# Patient Record
Sex: FEMALE | Race: NATIVE HAWAIIAN OR OTHER PACIFIC ISLANDER | NOT HISPANIC OR LATINO | Employment: OTHER | URBAN - METROPOLITAN AREA
[De-identification: names, ages, dates, MRNs, and addresses within clinical notes are randomized per-mention and may not be internally consistent; named-entity substitution may affect disease eponyms.]

---

## 2019-04-16 ENCOUNTER — TRANSFERRED RECORDS (OUTPATIENT)
Dept: HEALTH INFORMATION MANAGEMENT | Facility: CLINIC | Age: 69
End: 2019-04-16

## 2019-04-16 ENCOUNTER — HOSPITAL ENCOUNTER (EMERGENCY)
Facility: CLINIC | Age: 69
Discharge: HOME | End: 2019-04-16
Attending: EMERGENCY MEDICINE | Admitting: EMERGENCY MEDICINE
Payer: MEDICARE

## 2019-04-16 ENCOUNTER — APPOINTMENT (OUTPATIENT)
Dept: GENERAL RADIOLOGY | Facility: CLINIC | Age: 69
End: 2019-04-16
Attending: EMERGENCY MEDICINE
Payer: MEDICARE

## 2019-04-16 VITALS
RESPIRATION RATE: 20 BRPM | SYSTOLIC BLOOD PRESSURE: 186 MMHG | OXYGEN SATURATION: 100 % | TEMPERATURE: 97.9 F | DIASTOLIC BLOOD PRESSURE: 95 MMHG | HEART RATE: 56 BPM

## 2019-04-16 DIAGNOSIS — R07.9 CHEST PAIN, UNSPECIFIED TYPE: ICD-10-CM

## 2019-04-16 LAB
ANION GAP SERPL CALCULATED.3IONS-SCNC: 10 MMOL/L (ref 3–14)
BASOPHILS # BLD AUTO: 0.1 10E9/L (ref 0–0.2)
BASOPHILS NFR BLD AUTO: 1 %
BUN SERPL-MCNC: 18 MG/DL (ref 7–30)
CALCIUM SERPL-MCNC: 9.3 MG/DL (ref 8.5–10.1)
CHLORIDE SERPL-SCNC: 104 MMOL/L (ref 94–109)
CO2 SERPL-SCNC: 26 MMOL/L (ref 20–32)
CREAT SERPL-MCNC: 0.73 MG/DL (ref 0.52–1.04)
DIFFERENTIAL METHOD BLD: NORMAL
EOSINOPHIL # BLD AUTO: 0.7 10E9/L (ref 0–0.7)
EOSINOPHIL NFR BLD AUTO: 8.5 %
ERYTHROCYTE [DISTWIDTH] IN BLOOD BY AUTOMATED COUNT: 12.3 % (ref 10–15)
GFR SERPL CREATININE-BSD FRML MDRD: 85 ML/MIN/{1.73_M2}
GLUCOSE SERPL-MCNC: 118 MG/DL (ref 70–99)
HCT VFR BLD AUTO: 41.1 % (ref 35–47)
HGB BLD-MCNC: 13.8 G/DL (ref 11.7–15.7)
IMM GRANULOCYTES # BLD: 0 10E9/L (ref 0–0.4)
IMM GRANULOCYTES NFR BLD: 0.2 %
LYMPHOCYTES # BLD AUTO: 3.5 10E9/L (ref 0.8–5.3)
LYMPHOCYTES NFR BLD AUTO: 42.1 %
MCH RBC QN AUTO: 28.5 PG (ref 26.5–33)
MCHC RBC AUTO-ENTMCNC: 33.6 G/DL (ref 31.5–36.5)
MCV RBC AUTO: 85 FL (ref 78–100)
MONOCYTES # BLD AUTO: 0.5 10E9/L (ref 0–1.3)
MONOCYTES NFR BLD AUTO: 5.6 %
NEUTROPHILS # BLD AUTO: 3.5 10E9/L (ref 1.6–8.3)
NEUTROPHILS NFR BLD AUTO: 42.6 %
NRBC # BLD AUTO: 0 10*3/UL
NRBC BLD AUTO-RTO: 0 /100
PLATELET # BLD AUTO: 281 10E9/L (ref 150–450)
POTASSIUM SERPL-SCNC: 3.7 MMOL/L (ref 3.4–5.3)
RBC # BLD AUTO: 4.84 10E12/L (ref 3.8–5.2)
SODIUM SERPL-SCNC: 140 MMOL/L (ref 133–144)
TROPONIN I SERPL-MCNC: <0.015 UG/L (ref 0–0.04)
WBC # BLD AUTO: 8.3 10E9/L (ref 4–11)

## 2019-04-16 PROCEDURE — 85025 COMPLETE CBC W/AUTO DIFF WBC: CPT | Performed by: EMERGENCY MEDICINE

## 2019-04-16 PROCEDURE — 84484 ASSAY OF TROPONIN QUANT: CPT | Performed by: EMERGENCY MEDICINE

## 2019-04-16 PROCEDURE — 93005 ELECTROCARDIOGRAM TRACING: CPT

## 2019-04-16 PROCEDURE — 80048 BASIC METABOLIC PNL TOTAL CA: CPT | Performed by: EMERGENCY MEDICINE

## 2019-04-16 PROCEDURE — 99285 EMERGENCY DEPT VISIT HI MDM: CPT | Mod: 25

## 2019-04-16 PROCEDURE — 71046 X-RAY EXAM CHEST 2 VIEWS: CPT

## 2019-04-16 ASSESSMENT — ENCOUNTER SYMPTOMS
CHILLS: 0
BACK PAIN: 0
ABDOMINAL PAIN: 0
SHORTNESS OF BREATH: 0
DYSURIA: 0
PALPITATIONS: 0
DIFFICULTY URINATING: 0
COUGH: 0
VOMITING: 0
NAUSEA: 0
FEVER: 0

## 2019-04-16 NOTE — ED NOTES
Bed: ED15  Expected date: 4/16/19  Expected time: 5:18 PM  Means of arrival:   Comments:  [id number] CP, t-wave abnormality

## 2019-04-16 NOTE — ED PROVIDER NOTES
"  History     Chief Complaint:  Chest pain    HPI   Judith Palomares is a 68 year old female who presents with chest pain and shortness of breath. The patient states that she has a long standing history of exertional chest pain for which she has followed at [facility] at one point with an unremarkable exam at that time. The patient reports always having some left sided chest pain with heavy exertion that resolved around 5 minutes later when she stops and rests. The patient notes that since Saturday, she has been exerting herself much more than usual as she is preparing for a trip abroad. Given the more frequent chest pains she was having during this exertion, the patient states she \"decided to get checked out\" and went to clinic. There the patient reportedly had some T wave changes and was referred to the ED via EMS for evaluation. EMS notes that the patient was vitally stable and had a pre-hospital EKG that revealed some T wave changes but no signs of STEMI. The patient here states she has no active chest pain and only has this with exertion, in particular going up stairs. She otherwise denies any shortness of breath or recent illness. The patient has no fevers, chills, medication changes, back pain, abdominal pain, nausea, vomiting, diarrhea, or urinary symptoms.    CARDIAC RISK FACTORS:  Sex:    Female  Tobacco:   Former  Hypertension:   No  Hyperlipidemia:  No  Diabetes:   No  Family History:  No    PE/DVT RISK FACTORS:  Sex:    Female  Hormones:   Former  Tobacco:   No  Cancer:   No  Travel:   No  Surgery:   No  Other immobilization: No  Personal history:  No  Family history:  No    Allergies:  No known drug allergies     Medications:    Plavix     Past Medical History:    Stroke     Past Surgical History:    History reviewed. No pertinent surgical history.     Family History:    History reviewed. No pertinent family history.      Social History:  Smoking Status: Former Smoker  Patient presents with " .      Review of Systems   Constitutional: Negative for chills and fever.   Respiratory: Negative for cough and shortness of breath.    Cardiovascular: Positive for chest pain. Negative for palpitations and leg swelling.   Gastrointestinal: Negative for abdominal pain, nausea and vomiting.   Genitourinary: Negative for difficulty urinating, dysuria and urgency.   Musculoskeletal: Negative for back pain.   All other systems reviewed and are negative.      Physical Exam     Patient Vitals for the past 24 hrs:   BP Temp Temp src Heart Rate Resp SpO2   04/16/19 1730 -- 97.9  F (36.6  C) Oral -- -- --   04/16/19 1727 (!) 204/112 -- -- 60 18 100 %      Physical Exam  Constitutional: vitals reviewed  HENT: Moist oral mucosa.   Eyes: Grossly normal vision. Pupils are equal and round. Extraocular movements intact.  Sclera clear and without icterus. Conjunctiva without pallor.  Cardiovascular: Normal rate. Regular rhythm. S1 and S2 without audible murmurs. 2+ radial pulses. Normal capillary refill.  Respiratory: Effort normal. Clear breath sounds bilaterally.  Gastrointestinal: Soft. No distension. No tenderness to palpation. No rebound or guarding.  Neurologic: Alert and awake. Coordinated movement of extremities. Speech normal.  Skin: No diaphoresis, rashes, ecchymoses, or lesions.  Musculoskeletal: No lower extremity edema. No gross deformity. No joint swelling.  Psychiatric: Appropriate affect. Behavior is normal. Intact recent and remote memory. Linear thought process.      Emergency Department Course     ECG (17:32:28):  Rate 58 bpm. CA interval 170. QRS duration 142. QT/QTc 474/465. P-R-T axes 35 -48 -1. Sinus bradycardia. Left axis deviation. right bundle branch block. Inferior infarct, age undetermined. Abnormal ECG. Interpreted at 1738 by Brock Pichardo MD.     Imaging:  Radiographic findings were communicated with the patient who voiced understanding of the findings.    X-ray Chest, 2 views:  Clear  lungs.  Result per radiology.     Laboratory:  1741 - Troponin: <0.015   CBC: WNL (WBC 8.3, HGB 13.8, )   BMP: Glucose 118 (H), o/w WNL (Creatinine 0.73)     Emergency Department Course:  The patient arrived in the emergency department via EMS.   Past medical records, nursing notes, and vitals reviewed.  1723: I performed an exam of the patient and obtained history, as documented above.   IV inserted and blood drawn.     The patient was sent for a X-ray while in the emergency department, findings above.        1842: I rechecked the patient. Findings and plan explained to the Patient. Patient discharged home with instructions regarding supportive care, medications, and reasons to return. The importance of close follow-up was reviewed.      Impression & Plan      Medical Decision Making:  Patient who presents with exertional chest pain that has been occurring for the last 2 years with severe exertion.  She is concerned today because she is going abroad for vacation.  Vitals are stable with hypertension noted.  No ongoing pain symptoms.  ECG from clinic is stable compared to the ECG obtained in the emergency department here.  Chest x-ray is clear.  Pulses are symmetric, and without chest pain or neurologic symptoms I have low concern for dissection.  Low concern for pulmonary embolism as well.  Troponin is negative, and with her ED ACS score being low risk, I feel that outpatient follow-up is appropriate.  That this is possibly a diagnosis of noncardiac chest pain versus stable angina given that she has had the symptoms for over 2 years without change or escalation.  She is already taking Plavix.  Follow-up with primary care in 1 day for further risk assessment and work-up.  Return precautions discussed.  She left the emergency department in stable condition.    Diagnosis:    ICD-10-CM    1. Chest pain, unspecified type R07.9        Long Pang  4/16/2019    EMERGENCY DEPARTMENT  ILong am  serving as a scribe at 6:44 PM on 4/16/2019 to document services personally performed by Brock Pichardo MD based on my observations and the provider's statements to me.         Brock Pichardo MD  04/16/19 2043

## 2019-04-16 NOTE — ED AVS SNAPSHOT
Emergency Department  [address] [place] [id number]  Phone:  [phone]  Fax:  [phone]                                    Soledad Palomares   MRN: [id number]    Department:   Emergency Department   Date of Visit:  4/16/2019           After Visit Summary Signature Page    I have received my discharge instructions, and my questions have been answered. I have discussed any challenges I see with this plan with the nurse or doctor.    ..........................................................................................................................................  Patient/Patient Representative Signature      ..........................................................................................................................................  Patient Representative Print Name and Relationship to Patient    ..................................................               ................................................  Date                                   Time    ..........................................................................................................................................  Reviewed by Signature/Title    ...................................................              ..............................................  Date                                               Time          22EPIC Rev 08/18

## 2019-04-16 NOTE — DISCHARGE INSTRUCTIONS
Discharge Instructions  Chest Pain    You have been seen today for chest pain or discomfort.  At this time, your provider has found no signs that your chest pain is due to a serious or life-threatening condition, (or you have declined more testing and/or admission to the hospital). However, sometimes there is a serious problem that does not show up right away. Your evaluation today may not be complete and you may need further testing and evaluation.     Generally, every Emergency Department visit should have a follow-up clinic visit with either a primary or a specialty clinic/provider. Please follow-up as instructed by your emergency provider today.  Return to the Emergency Department if:  Your chest pain changes, gets worse, starts to happen more often, or comes with less activity.  You are newly short of breath.  You get very weak or tired.  You pass out or faint.  You have any new symptoms, like fever, cough, numb legs, or you cough up blood.  You have anything else that worries you.    Until you follow-up with your regular provider, please do the following:  If you have questions, contact your regular provider.  Follow-up with your regular provider/clinic as directed; this is very important.    If you were given a prescription for medicine here today, be sure to read all of the information (including the package insert) that comes with your prescription.  This will include important information about the medicine, its side effects, and any warnings that you need to know about.  The pharmacist who fills the prescription can provide more information and answer questions you may have about the medicine.  If you have questions or concerns that the pharmacist cannot address, please call or return to the Emergency Department.       Remember that you can always come back to the Emergency Department if you are not able to see your regular provider in the amount of time listed above, if you get any new symptoms, or if  there is anything that worries you.

## 2019-04-18 LAB — INTERPRETATION ECG - MUSE: NORMAL

## 2019-11-14 ENCOUNTER — HOSPITAL ENCOUNTER (OUTPATIENT)
Facility: CLINIC | Age: 69
Setting detail: OBSERVATION
Discharge: HOME | End: 2019-11-15
Attending: EMERGENCY MEDICINE | Admitting: INTERNAL MEDICINE
Payer: MEDICARE

## 2019-11-14 ENCOUNTER — APPOINTMENT (OUTPATIENT)
Dept: GENERAL RADIOLOGY | Facility: CLINIC | Age: 69
End: 2019-11-14
Attending: EMERGENCY MEDICINE
Payer: MEDICARE

## 2019-11-14 DIAGNOSIS — R07.9 EXERTIONAL CHEST PAIN: ICD-10-CM

## 2019-11-14 LAB
ANION GAP SERPL CALCULATED.3IONS-SCNC: 6 MMOL/L (ref 3–14)
BASOPHILS # BLD AUTO: 0.1 10E9/L (ref 0–0.2)
BASOPHILS NFR BLD AUTO: 0.6 %
BUN SERPL-MCNC: 20 MG/DL (ref 7–30)
CALCIUM SERPL-MCNC: 8.8 MG/DL (ref 8.5–10.1)
CHLORIDE SERPL-SCNC: 100 MMOL/L (ref 94–109)
CO2 SERPL-SCNC: 27 MMOL/L (ref 20–32)
CREAT SERPL-MCNC: 0.72 MG/DL (ref 0.52–1.04)
D DIMER PPP FEU-MCNC: <0.3 UG/ML FEU (ref 0–0.5)
DIFFERENTIAL METHOD BLD: NORMAL
EOSINOPHIL # BLD AUTO: 0.5 10E9/L (ref 0–0.7)
EOSINOPHIL NFR BLD AUTO: 5.9 %
ERYTHROCYTE [DISTWIDTH] IN BLOOD BY AUTOMATED COUNT: 12.1 % (ref 10–15)
GFR SERPL CREATININE-BSD FRML MDRD: 85 ML/MIN/{1.73_M2}
GLUCOSE BLDC GLUCOMTR-MCNC: 190 MG/DL (ref 70–99)
GLUCOSE SERPL-MCNC: 179 MG/DL (ref 70–99)
HBA1C MFR BLD: 8.8 % (ref 0–5.6)
HCT VFR BLD AUTO: 41.1 % (ref 35–47)
HGB BLD-MCNC: 13.8 G/DL (ref 11.7–15.7)
IMM GRANULOCYTES # BLD: 0 10E9/L (ref 0–0.4)
IMM GRANULOCYTES NFR BLD: 0.3 %
INTERPRETATION ECG - MUSE: NORMAL
LYMPHOCYTES # BLD AUTO: 2.7 10E9/L (ref 0.8–5.3)
LYMPHOCYTES NFR BLD AUTO: 33.8 %
MCH RBC QN AUTO: 28.2 PG (ref 26.5–33)
MCHC RBC AUTO-ENTMCNC: 33.6 G/DL (ref 31.5–36.5)
MCV RBC AUTO: 84 FL (ref 78–100)
MONOCYTES # BLD AUTO: 0.5 10E9/L (ref 0–1.3)
MONOCYTES NFR BLD AUTO: 6.2 %
NEUTROPHILS # BLD AUTO: 4.2 10E9/L (ref 1.6–8.3)
NEUTROPHILS NFR BLD AUTO: 53.2 %
NRBC # BLD AUTO: 0 10*3/UL
NRBC BLD AUTO-RTO: 0 /100
PLATELET # BLD AUTO: 274 10E9/L (ref 150–450)
POTASSIUM SERPL-SCNC: 3.5 MMOL/L (ref 3.4–5.3)
RBC # BLD AUTO: 4.9 10E12/L (ref 3.8–5.2)
SODIUM SERPL-SCNC: 133 MMOL/L (ref 133–144)
TROPONIN I SERPL-MCNC: <0.015 UG/L (ref 0–0.04)
TROPONIN I SERPL-MCNC: <0.015 UG/L (ref 0–0.04)
WBC # BLD AUTO: 7.9 10E9/L (ref 4–11)

## 2019-11-14 PROCEDURE — 71046 X-RAY EXAM CHEST 2 VIEWS: CPT

## 2019-11-14 PROCEDURE — 93005 ELECTROCARDIOGRAM TRACING: CPT

## 2019-11-14 PROCEDURE — 84484 ASSAY OF TROPONIN QUANT: CPT | Performed by: EMERGENCY MEDICINE

## 2019-11-14 PROCEDURE — 84484 ASSAY OF TROPONIN QUANT: CPT | Mod: 91 | Performed by: INTERNAL MEDICINE

## 2019-11-14 PROCEDURE — 00000146 ZZHCL STATISTIC GLUCOSE BY METER IP

## 2019-11-14 PROCEDURE — 83036 HEMOGLOBIN GLYCOSYLATED A1C: CPT | Performed by: INTERNAL MEDICINE

## 2019-11-14 PROCEDURE — 25000132 ZZH RX MED GY IP 250 OP 250 PS 637: Mod: GY | Performed by: INTERNAL MEDICINE

## 2019-11-14 PROCEDURE — 85379 FIBRIN DEGRADATION QUANT: CPT | Performed by: INTERNAL MEDICINE

## 2019-11-14 PROCEDURE — 99285 EMERGENCY DEPT VISIT HI MDM: CPT | Mod: 25

## 2019-11-14 PROCEDURE — G0378 HOSPITAL OBSERVATION PER HR: HCPCS

## 2019-11-14 PROCEDURE — 36415 COLL VENOUS BLD VENIPUNCTURE: CPT | Performed by: INTERNAL MEDICINE

## 2019-11-14 PROCEDURE — 80048 BASIC METABOLIC PNL TOTAL CA: CPT | Performed by: EMERGENCY MEDICINE

## 2019-11-14 PROCEDURE — 99220 ZZC INITIAL OBSERVATION CARE,LEVL III: CPT | Performed by: INTERNAL MEDICINE

## 2019-11-14 PROCEDURE — 85025 COMPLETE CBC W/AUTO DIFF WBC: CPT | Performed by: EMERGENCY MEDICINE

## 2019-11-14 RX ORDER — ACETAMINOPHEN 650 MG/1
650 SUPPOSITORY RECTAL EVERY 4 HOURS PRN
Status: DISCONTINUED | OUTPATIENT
Start: 2019-11-14 | End: 2019-11-15 | Stop reason: HOSPADM

## 2019-11-14 RX ORDER — METFORMIN HCL 500 MG
1000 TABLET, EXTENDED RELEASE 24 HR ORAL 2 TIMES DAILY WITH MEALS
COMMUNITY

## 2019-11-14 RX ORDER — DEXTROSE MONOHYDRATE 25 G/50ML
25-50 INJECTION, SOLUTION INTRAVENOUS
Status: DISCONTINUED | OUTPATIENT
Start: 2019-11-14 | End: 2019-11-15 | Stop reason: HOSPADM

## 2019-11-14 RX ORDER — ACETAMINOPHEN 325 MG/1
650 TABLET ORAL EVERY 4 HOURS PRN
Status: DISCONTINUED | OUTPATIENT
Start: 2019-11-14 | End: 2019-11-15 | Stop reason: HOSPADM

## 2019-11-14 RX ORDER — FENOFIBRATE 67 MG/1
67 CAPSULE ORAL
COMMUNITY

## 2019-11-14 RX ORDER — NALOXONE HYDROCHLORIDE 0.4 MG/ML
.1-.4 INJECTION, SOLUTION INTRAMUSCULAR; INTRAVENOUS; SUBCUTANEOUS
Status: DISCONTINUED | OUTPATIENT
Start: 2019-11-14 | End: 2019-11-15 | Stop reason: HOSPADM

## 2019-11-14 RX ORDER — HYDROCHLOROTHIAZIDE 25 MG/1
25 TABLET ORAL EVERY MORNING
Status: DISCONTINUED | OUTPATIENT
Start: 2019-11-15 | End: 2019-11-15 | Stop reason: HOSPADM

## 2019-11-14 RX ORDER — NITROGLYCERIN 0.4 MG/1
0.4 TABLET SUBLINGUAL EVERY 5 MIN PRN
Status: DISCONTINUED | OUTPATIENT
Start: 2019-11-14 | End: 2019-11-15 | Stop reason: HOSPADM

## 2019-11-14 RX ORDER — ASPIRIN 81 MG/1
162 TABLET, CHEWABLE ORAL ONCE
Status: COMPLETED | OUTPATIENT
Start: 2019-11-14 | End: 2019-11-14

## 2019-11-14 RX ORDER — ASPIRIN 81 MG/1
81 TABLET ORAL DAILY
Status: DISCONTINUED | OUTPATIENT
Start: 2019-11-15 | End: 2019-11-15 | Stop reason: HOSPADM

## 2019-11-14 RX ORDER — ALUMINA, MAGNESIA, AND SIMETHICONE 2400; 2400; 240 MG/30ML; MG/30ML; MG/30ML
15 SUSPENSION ORAL EVERY 4 HOURS PRN
Status: DISCONTINUED | OUTPATIENT
Start: 2019-11-14 | End: 2019-11-15 | Stop reason: HOSPADM

## 2019-11-14 RX ORDER — LOSARTAN POTASSIUM 50 MG/1
50 TABLET ORAL 2 TIMES DAILY
Status: DISCONTINUED | OUTPATIENT
Start: 2019-11-14 | End: 2019-11-15 | Stop reason: HOSPADM

## 2019-11-14 RX ORDER — NICOTINE POLACRILEX 4 MG
15-30 LOZENGE BUCCAL
Status: DISCONTINUED | OUTPATIENT
Start: 2019-11-14 | End: 2019-11-15 | Stop reason: HOSPADM

## 2019-11-14 RX ORDER — LOSARTAN POTASSIUM 50 MG/1
50 TABLET ORAL 2 TIMES DAILY
COMMUNITY

## 2019-11-14 RX ORDER — CLOPIDOGREL BISULFATE 75 MG/1
75 TABLET ORAL DAILY
Status: DISCONTINUED | OUTPATIENT
Start: 2019-11-15 | End: 2019-11-15 | Stop reason: HOSPADM

## 2019-11-14 RX ORDER — LIDOCAINE 40 MG/G
CREAM TOPICAL
Status: DISCONTINUED | OUTPATIENT
Start: 2019-11-14 | End: 2019-11-15 | Stop reason: HOSPADM

## 2019-11-14 RX ADMIN — ASPIRIN 81 MG 162 MG: 81 TABLET ORAL at 20:20

## 2019-11-14 RX ADMIN — LOSARTAN POTASSIUM 50 MG: 50 TABLET, FILM COATED ORAL at 20:20

## 2019-11-14 ASSESSMENT — MIFFLIN-ST. JEOR
SCORE: 1279.38
SCORE: 1340.08

## 2019-11-14 ASSESSMENT — ENCOUNTER SYMPTOMS: NUMBNESS: 1

## 2019-11-14 NOTE — ED PROVIDER NOTES
"  History     Chief Complaint:  Chest Pain      HPI   Soledad Palomares is a 69 year old female who presents to the emergency department for evaluation of chest pain. The patient reports that three fingers on her left hand were numb this morning along with chest pain when she woke up. The patient reports that her numbness lasted about 5 minutes. The patient also reports that she has left shoulder pain. The patient states that her last stress test was about 7 years ago. She denies that she has a stent in her heart. The patient reports numbness, left shoulder pain and chest pain.    Allergies:  Lisinopril    Medications:    Atenolol  Atorvastatin  Plavix  Lofibra  Hydrochlorothiazide  Lantus  Cozaar  Metformin  Ozempic    Past Medical History:    Stroke  Cerebral infarction  Diabetes  Hypertension  Myocardial infarction    Past Surgical History:    Cholecystectomy  Orthopedic surgery  Foot surgery  Rotator cuff surgery    Family History:    No past pertinent family history.    Social History:  The patient presents with her two granddaughters.  Former smoker  Not currently using alcohol.  Negative for drug use.  Marital Status:      Review of Systems   Cardiovascular: Positive for chest pain.   Musculoskeletal:        Left shoulder pain   Neurological: Positive for numbness.   All other systems reviewed and are negative.      Physical Exam     Patient Vitals for the past 24 hrs:   BP Temp Temp src Pulse Resp SpO2 Height Weight   11/14/19 1555 137/79 -- -- -- -- -- -- --   11/14/19 1553 (!) 138/103 97.7  F (36.5  C) Temporal 67 16 95 % 1.575 m (5' 2\") 86.2 kg (190 lb)         Physical Exam    Eye:  Pupils are equal, round, and reactive.  Extraocular movements intact.    ENT:  No rhinorrhea.  Moist mucus membranes.  Normal tongue and tonsil.    Cardiac:  Regular rate and rhythm.  No murmurs, gallops, or rubs.    Pulmonary:  Clear to auscultation bilaterally.  No wheezes, rales, or rhonchi.    Abdomen:  Positive " bowel sounds.  Abdomen is soft and non-distended, without focal tenderness.    Musculoskeletal:  Normal movement of all extremities without evidence for deficit.    Skin:  Warm and dry without rashes.    Neurologic:  Non-focal exam without asymmetric weakness or numbness.     Psychiatric:  Normal affect with appropriate interaction with examiner.    Emergency Department Course     ECG:  Time: 1550  Vent. Rate 65 bpm. UT interval 170. QRS duration 102. QT/QTc 442/459. P-R-T axis 27 -47 47.  Normal sinus rhythm  Left axis deviation  Inferior infarct, age undetermined  Abnormal ECG  Read time: 1700    Imaging:  Radiology findings were communicated with the patient who voiced understanding of the findings.    XR Chest 2 Views:  No acute airspace disease.  As per radiology.    Laboratory:  Laboratory findings were communicated with the patient who voiced understanding of the findings.    CBC: WNL. (WBC 7.9, HGB 13.8, )   BMP: Glucose 179 (H) o/w WNL (Creatinine 0.72)    1659 Troponin I <0.015     Emergency Department Course:    1550 EKG obtained as noted above.    1647 Nursing notes and vitals reviewed.    1649 I performed an exam of the patient as documented above.     1659 IV was inserted and blood was drawn for laboratory testing, results above.    1734 The patient was sent for a XR Chest 2 Views while in the emergency department, results above.     1817 Patient rechecked and updated.     1846 I spoke with Dr. Bruner of the hospitalist service from [facility] regarding patient's presentation, findings, and plan of care.    Findings and plan explained to the Patient who consents to admission. Discussed the patient with Dr. Bruner, who will admit the patient to a Obs bed for further monitoring, evaluation, and treatment.      Impression & Plan     Medical Decision Making:  Soledad Palomares is a 69 year old female who presents to the emergency department today with concerns for exertional chest pain.   Patient has a long history of this and has undergone prior stress testing which is always been negative.  However, she has many cardiac risk factors and is not had a stress test in several years.  She notes that when she exerts herself she will feel a tightness across the left side of her chest.  She was chiefly concerned today because when she woke up this morning, she felt numbness and tingling into her left hand that seem to radiate up into the chest.  Once she rolled over and got up, the tingling went away after approximately 5 minutes.  Because of concerns that this is an anginal equivalent, she elected to come in to be seen.    On my exam, the patient appears clinically well.  She has no chest pain at this time.  Her vital signs are reassuring.  Head to toe exam shows no significant abnormality.  EKG is reassuring.  Chest x-ray is normal.  Initial troponin is negative.  Her labs are otherwise unremarkable.    Because of her multiple risk factors along with exertional chest pain, I do feel it is reasonable to formally rule her out and to consider provocative testing in the morning.  The patient is comfortable with this plan for admission as is Dr. Bruner who will admit the patient under observation status.    Discharge Diagnosis:    ICD-10-CM    1. Exertional chest pain R07.9        Disposition:  The patient is admitted into the care of Dr. Bruner.     Scribe Disclosure:  I, Cedric Butler, am serving as a scribe at 4:57 PM on 11/14/2019 to document services personally performed by Trierweiler, Chad A, MD based on my observations and the provider's statements to me.      Trierweiler, Chad A, MD  11/15/19 4031

## 2019-11-14 NOTE — ED TRIAGE NOTES
Patient in with complaints of chest pain, numbness in her left fingers with tingling in left arm. Denies dizziness and SOB.

## 2019-11-15 ENCOUNTER — APPOINTMENT (OUTPATIENT)
Dept: NUCLEAR MEDICINE | Facility: CLINIC | Age: 69
End: 2019-11-15
Attending: INTERNAL MEDICINE
Payer: MEDICARE

## 2019-11-15 VITALS
OXYGEN SATURATION: 95 % | HEART RATE: 56 BPM | TEMPERATURE: 97.1 F | RESPIRATION RATE: 16 BRPM | SYSTOLIC BLOOD PRESSURE: 160 MMHG | DIASTOLIC BLOOD PRESSURE: 86 MMHG | HEIGHT: 62 IN | BODY MASS INDEX: 32.5 KG/M2 | WEIGHT: 176.59 LBS

## 2019-11-15 LAB
CV STRESS MAX HR HE: 81
GLUCOSE BLDC GLUCOMTR-MCNC: 148 MG/DL (ref 70–99)
GLUCOSE BLDC GLUCOMTR-MCNC: 158 MG/DL (ref 70–99)
GLUCOSE BLDC GLUCOMTR-MCNC: 185 MG/DL (ref 70–99)
GLUCOSE BLDC GLUCOMTR-MCNC: 234 MG/DL (ref 70–99)
RATE PRESSURE PRODUCT: NORMAL
STRESS ECHO BASELINE DIASTOLIC HE: 87
STRESS ECHO BASELINE HR: 56
STRESS ECHO BASELINE SYSTOLIC BP: 142
STRESS ECHO CALCULATED PERCENT HR: 54 %
STRESS ECHO LAST STRESS DIASTOLIC BP: 75
STRESS ECHO LAST STRESS SYSTOLIC BP: 131
STRESS ECHO TARGET HR: 151
TROPONIN I SERPL-MCNC: <0.015 UG/L (ref 0–0.04)

## 2019-11-15 PROCEDURE — 78452 HT MUSCLE IMAGE SPECT MULT: CPT

## 2019-11-15 PROCEDURE — 93018 CV STRESS TEST I&R ONLY: CPT | Performed by: INTERNAL MEDICINE

## 2019-11-15 PROCEDURE — G0378 HOSPITAL OBSERVATION PER HR: HCPCS

## 2019-11-15 PROCEDURE — 40000855 ZZH STATISTIC ECHO STRESS OR NM NPI

## 2019-11-15 PROCEDURE — 78452 HT MUSCLE IMAGE SPECT MULT: CPT | Mod: 26 | Performed by: INTERNAL MEDICINE

## 2019-11-15 PROCEDURE — A9502 TC99M TETROFOSMIN: HCPCS | Performed by: INTERNAL MEDICINE

## 2019-11-15 PROCEDURE — 34300033 ZZH RX 343: Performed by: INTERNAL MEDICINE

## 2019-11-15 PROCEDURE — 84484 ASSAY OF TROPONIN QUANT: CPT | Performed by: INTERNAL MEDICINE

## 2019-11-15 PROCEDURE — 93016 CV STRESS TEST SUPVJ ONLY: CPT | Performed by: INTERNAL MEDICINE

## 2019-11-15 PROCEDURE — 25000131 ZZH RX MED GY IP 250 OP 636 PS 637: Mod: GY | Performed by: INTERNAL MEDICINE

## 2019-11-15 PROCEDURE — 99217 ZZC OBSERVATION CARE DISCHARGE: CPT | Performed by: PHYSICIAN ASSISTANT

## 2019-11-15 PROCEDURE — 93017 CV STRESS TEST TRACING ONLY: CPT

## 2019-11-15 PROCEDURE — 00000146 ZZHCL STATISTIC GLUCOSE BY METER IP

## 2019-11-15 PROCEDURE — 25000128 H RX IP 250 OP 636: Performed by: INTERNAL MEDICINE

## 2019-11-15 PROCEDURE — 96372 THER/PROPH/DIAG INJ SC/IM: CPT

## 2019-11-15 PROCEDURE — 25000132 ZZH RX MED GY IP 250 OP 250 PS 637: Mod: GY | Performed by: INTERNAL MEDICINE

## 2019-11-15 PROCEDURE — 36415 COLL VENOUS BLD VENIPUNCTURE: CPT | Performed by: INTERNAL MEDICINE

## 2019-11-15 RX ORDER — OMEPRAZOLE 20 MG/1
20 TABLET, DELAYED RELEASE ORAL DAILY
Qty: 30 TABLET | Refills: 0 | Status: SHIPPED | OUTPATIENT
Start: 2019-11-15

## 2019-11-15 RX ORDER — REGADENOSON 0.08 MG/ML
0.4 INJECTION, SOLUTION INTRAVENOUS ONCE
Status: COMPLETED | OUTPATIENT
Start: 2019-11-15 | End: 2019-11-15

## 2019-11-15 RX ORDER — AMINOPHYLLINE 25 MG/ML
50-100 INJECTION, SOLUTION INTRAVENOUS
Status: DISCONTINUED | OUTPATIENT
Start: 2019-11-15 | End: 2019-11-15

## 2019-11-15 RX ORDER — ALBUTEROL SULFATE 90 UG/1
2 AEROSOL, METERED RESPIRATORY (INHALATION) EVERY 5 MIN PRN
Status: DISCONTINUED | OUTPATIENT
Start: 2019-11-15 | End: 2019-11-15

## 2019-11-15 RX ORDER — LOSARTAN POTASSIUM 50 MG/1
50 TABLET ORAL DAILY
Start: 2019-11-15

## 2019-11-15 RX ORDER — ACYCLOVIR 200 MG/1
0-1 CAPSULE ORAL
Status: DISCONTINUED | OUTPATIENT
Start: 2019-11-15 | End: 2019-11-15

## 2019-11-15 RX ADMIN — ASPIRIN 81 MG: 81 TABLET, COATED ORAL at 08:32

## 2019-11-15 RX ADMIN — CLOPIDOGREL BISULFATE 75 MG: 75 TABLET ORAL at 08:32

## 2019-11-15 RX ADMIN — REGADENOSON 0.4 MG: 0.08 INJECTION, SOLUTION INTRAVENOUS at 09:43

## 2019-11-15 RX ADMIN — INSULIN GLARGINE 30 UNITS: 100 INJECTION, SOLUTION SUBCUTANEOUS at 08:35

## 2019-11-15 RX ADMIN — LOSARTAN POTASSIUM 50 MG: 50 TABLET, FILM COATED ORAL at 08:32

## 2019-11-15 RX ADMIN — TETROFOSMIN 30.4 MCI.: 1.38 INJECTION, POWDER, LYOPHILIZED, FOR SOLUTION INTRAVENOUS at 09:43

## 2019-11-15 RX ADMIN — HYDROCHLOROTHIAZIDE 25 MG: 25 TABLET ORAL at 08:32

## 2019-11-15 RX ADMIN — TETROFOSMIN 10.7 MCI.: 1.38 INJECTION, POWDER, LYOPHILIZED, FOR SOLUTION INTRAVENOUS at 07:51

## 2019-11-15 NOTE — PROGRESS NOTES
0943 procedure started    Lexiscan Stress: Pt tolerated well. VSS. Pt denied chest pain or pressure prior to injection. After injection pt c/o some chest heaviness and a HA.  Pt back to baseline except still a slight HA.  Encouraged to have caffeine when back to the room.    0950 procedure completed    0952 Pt transferred back to  Obs unit room 12 per w/c.

## 2019-11-15 NOTE — PLAN OF CARE
A&Ox4. BP elevated (160/86), otherwise VSS on RA. Independent in room. Continent, up to bathroom. Tele SR. BG insulin coverage given. Lexiscan stress test today, tolerated well. Regular diet. Denied pain, no CP. CMS intact.     Addendum: Pt discharged home with . PIV removed. Walked out of unit, refused w/c transport.

## 2019-11-15 NOTE — PROGRESS NOTES
RECEIVING UNIT ED HANDOFF REVIEW    ED Nurse Handoff Report was reviewed by: Jose Garner RN on November 14, 2019 at 7:34 PM

## 2019-11-15 NOTE — PROGRESS NOTES
Observation goals PRIOR TO DISCHARGE     Comments: List all goals to be met before discharge home:   - Serial troponins and stress test complete. NOT MET  - Seen and cleared by consultant if applicable N/A  - Adequate pain control on oral analgesia MET  - Vital signs normal or at patient baseline MET  - Safe disposition plan has been identified NOT MET  - Nurse to notify provider when observation goals have been met and patient is ready for discharge.

## 2019-11-15 NOTE — DISCHARGE SUMMARY
[facility]    Discharge Summary  Hospitalist    Date of Admission:  11/14/2019  Date of Discharge:  11/15/2019  Discharging Provider: Daniel Flores PA-C    Discharge Diagnoses   Exertional chest pain    History of Present Illness   Soledad Palomares is an 69 year old female who presented with chest pain.    HPI from admission H&P:  Soledad Palomares is a 69 year old female with a history of stroke on Plavix, insulin-dependent diabetes mellitus, hypertension who presents with several days of left-sided chest pain and left hand numbness and tingling today.  The patient reports that this morning she woke up with chest pain, rubbed her chest and tried to lay on her left side.  She started noticing some numbness on her left hand going up her arm which lasted for about 5 minutes.  She spoke with her family members and finally decided to present to the ED.  The patient has been having intermittent left-sided chest pain for several days now which recently started getting worse with activity.  She has had stress test x3 in the past with last one 7 to 9 years ago.  She denies any shortness of breath, palpitation, dizziness, lightheadedness, nausea, vomiting, diaphoresis.     When she presented to the ED temperature was 97.7, pulse rate 67, blood pressure 138/103 which improved to 137/79, respiratory 16, O2 sat 90% on room air.  Labs showed unremarkable CBC and a BMP, troponin x1 was negative glucose is 179.  Chest x-ray shows no acute cardiopulmonary process.    Hospital Course   Soledad Palomares was admitted on 11/14/2019.  The following problems were addressed during her hospitalization:    Chest pain   Presented with exertional chest pain for last several days with some atypical chest pain day of admission with left arm numbness. EKG negative, serial troponins negative. Lexiscan stress imaging negative. Noted blood pressures to be borderline, was also noted at PCP office during last visit. Discussed  possibility of hypertension versus GERD contributing to symptoms. Pt agreeable to trial PPI and increasing losartan to 100mg qAM, 50mg qPM. Will follow up closely with PCP at discharge to discuss.     Essential Hypertension   BPs initially elevated, remain slightly borderline at discharge. PTA on losartan, atenolol, hydrochlorothiazide. Changes as noted above.     Diabetes Mellitus type 2,  Insulin Dependent  PTA on Lantus 42 units in the morning, metformin 1000mg twice daily. Hgb A1c with improved control, 8.8%. Continues on PTA regimen, management per endocrinologist.     History of stroke  PTA on Plavix, continue PTA Plavix    Daniel Flores PA-C    Significant Results and Procedures   As noted    Pending Results   These results will be followed up by n/a  Unresulted Labs Ordered in the Past 30 Days of this Admission     No orders found for last 31 day(s).          Code Status   Full Code       Primary Care Physician   Audrey Martinez    Physical Exam   Temp: 97.1  F (36.2  C) Temp src: Oral BP: (!) 160/86 Pulse: 56 Heart Rate: 61 Resp: 16 SpO2: 95 % O2 Device: None (Room air)    Vitals:    11/14/19 1553 11/1950   Weight: 86.2 kg (190 lb) 80.1 kg (176 lb 9.4 oz)     Vital Signs with Ranges  Temp:  [95.5  F (35.3  C)-97.7  F (36.5  C)] 97.1  F (36.2  C)  Pulse:  [56-67] 56  Heart Rate:  [60-88] 61  Resp:  [16] 16  BP: (118-160)/() 160/86  SpO2:  [95 %-100 %] 95 %  No intake/output data recorded.    GEN: well-developed, well-nourished, appears comfortable  PULM: lungs CTA bilaterally, no increased work of breathing, no wheeze, rales, rhonchi  CV: RRR, S1 & S2, no murmur  GI: soft, nontender, nondistended, no guarding or rigidity, +BS in all 4 quadrants  SKIN: warm & dry without rash, wound, or pedal edema    Discharge Disposition   Discharged to home  Condition at discharge: Stable    Consultations This Hospital Stay   None    Time Spent on this Encounter   I, Daniel Flores PA-C, personally saw the  patient today and spent less than or equal to 30 minutes discharging this patient.    Discharge Orders   No discharge procedures on file.  Discharge Medications   Current Discharge Medication List      CONTINUE these medications which have NOT CHANGED    Details   ATENOLOL PO Take 25 mg by mouth At Bedtime       ATORVASTATIN CALCIUM PO Take 20 mg by mouth At Bedtime       Clopidogrel Bisulfate (PLAVIX PO) Take 75 mg by mouth daily       fenofibrate micronized (LOFIBRA) 67 MG capsule Take 67 mg by mouth every morning (before breakfast)      HYDROCHLOROTHIAZIDE PO Take 25 mg by mouth every morning       insulin glargine (LANTUS PEN) 100 UNIT/ML pen Inject 42 Units Subcutaneous every morning    Comments: If Lantus is not covered by insurance, may substitute Basaglar at same dose and frequency.        losartan (COZAAR) 50 MG tablet Take 50 mg by mouth 2 times daily      metFORMIN (GLUCOPHAGE-XR) 500 MG 24 hr tablet Take 1,000 mg by mouth 2 times daily (with meals)           Allergies   Allergies   Allergen Reactions     Lisinopril      Data   Most Recent 3 CBC's:  Recent Labs   Lab Test 11/14/19 1659 04/16/19  1741   WBC 7.9 8.3   HGB 13.8 13.8   MCV 84 85    281      Most Recent 3 BMP's:  Recent Labs   Lab Test 11/14/19 1659 04/16/19  1741    140   POTASSIUM 3.5 3.7   CHLORIDE 100 104   CO2 27 26   BUN 20 18   CR 0.72 0.73   ANIONGAP 6 10   ASHOK 8.8 9.3   * 118*     Most Recent 2 LFT's:No lab results found.  Most Recent INR's and Anticoagulation Dosing History:  Anticoagulation Dose History     There is no flowsheet data to display.        Most Recent 3 Troponin's:  Recent Labs   Lab Test 11/15/19  0020 11/14/19 2029 11/14/19 1659   TROPI <0.015 <0.015 <0.015     Most Recent Cholesterol Panel:No lab results found.  Most Recent 6 Bacteria Isolates From Any Culture (See EPIC Reports for Culture Details):No lab results found.  Most Recent TSH, T4 and A1c Labs:  Recent Labs   Lab Test 11/14/19 2029    A1C 8.8*     Results for orders placed or performed during the hospital encounter of 11/14/19   XR Chest 2 Views    Narrative    XR CHEST TWO VIEWS   11/14/2019 5:43 PM     HISTORY: Chest pain.    COMPARISON: Chest x-ray on 4/16/2019      Impression    IMPRESSION: No acute airspace disease.    JEANNE PALENCIA MD   NM Lexiscan stress test (nuc card)     Value    Target     Baseline Systolic     Baseline Diastolic BP 87    Last Stress Systolic     Last Stress Diastolic BP 75    Baseline HR 56    Max HR 81    Calculated Percent HR 54    Rate Pressure Product 10,611.0    Narrative       The nuclear stress test is negative for inducible myocardial ischemia   or infarction.     Left ventricular function is normal.     The left ventricular ejection fraction at stress is greater than 70%.     There is no prior study for comparison.

## 2019-11-15 NOTE — ED NOTES
"[facility]  ED Nurse Handoff Report    ED Chief complaint: Chest Pain      ED Diagnosis:   Final diagnoses:   Exertional chest pain       Code Status: Full Code, confirm with hospitalist    Allergies:   Allergies   Allergen Reactions     Lisinopril        Activity level - Baseline/Home:  Independent  Activity Level - Current:   Stand with Assist    Patient's Preferred language: English   Needed?: No    Isolation: No  Infection: Not Applicable  Bariatric?: No    Vital Signs:   Vitals:    11/14/19 1553 11/14/19 1555   BP: (!) 138/103 137/79   Pulse: 67    Resp: 16    Temp: 97.7  F (36.5  C)    TempSrc: Temporal    SpO2: 95%    Weight: 86.2 kg (190 lb)    Height: 1.575 m (5' 2\")        Cardiac Rhythm: ,        Pain level: 0-10 Pain Scale: 7    Is this patient confused?: No   Does this patient have a guardian?  No         If yes, is there guardianship documents in the Epic \"Code/ACP\" activity?  No         Guardian Notified?  N/A  [place] - Suicide Severity Rating Scale Completed?  No, secondary to n/a  If yes, what color did the patient score?  N/A    Patient Report: Initial Complaint: Chest pain  Focused Assessment: Pt reports chest pain and numbness in left fingers. Denies dizziness or difficulty breathing. States chest pain is gone at time of assessment. Reports history of similar chest pain in August.   Tests Performed: EKG, labs, CXR  Abnormal Results:   Results for orders placed or performed during the hospital encounter of 11/14/19   CBC with platelets differential     Status: None   Result Value Ref Range    WBC 7.9 4.0 - 11.0 10e9/L    RBC Count 4.90 3.8 - 5.2 10e12/L    Hemoglobin 13.8 11.7 - 15.7 g/dL    Hematocrit 41.1 35.0 - 47.0 %    MCV 84 78 - 100 fl    MCH 28.2 26.5 - 33.0 pg    MCHC 33.6 31.5 - 36.5 g/dL    RDW 12.1 10.0 - 15.0 %    Platelet Count 274 150 - 450 10e9/L    Diff Method Automated Method     % Neutrophils 53.2 %    % Lymphocytes 33.8 %    % Monocytes 6.2 %    % " Eosinophils 5.9 %    % Basophils 0.6 %    % Immature Granulocytes 0.3 %    Nucleated RBCs 0 0 /100    Absolute Neutrophil 4.2 1.6 - 8.3 10e9/L    Absolute Lymphocytes 2.7 0.8 - 5.3 10e9/L    Absolute Monocytes 0.5 0.0 - 1.3 10e9/L    Absolute Eosinophils 0.5 0.0 - 0.7 10e9/L    Absolute Basophils 0.1 0.0 - 0.2 10e9/L    Abs Immature Granulocytes 0.0 0 - 0.4 10e9/L    Absolute Nucleated RBC 0.0    Basic metabolic panel     Status: Abnormal   Result Value Ref Range    Sodium 133 133 - 144 mmol/L    Potassium 3.5 3.4 - 5.3 mmol/L    Chloride 100 94 - 109 mmol/L    Carbon Dioxide 27 20 - 32 mmol/L    Anion Gap 6 3 - 14 mmol/L    Glucose 179 (H) 70 - 99 mg/dL    Urea Nitrogen 20 7 - 30 mg/dL    Creatinine 0.72 0.52 - 1.04 mg/dL    GFR Estimate 85 >60 mL/min/[1.73_m2]    GFR Estimate If Black >90 >60 mL/min/[1.73_m2]    Calcium 8.8 8.5 - 10.1 mg/dL   Troponin I     Status: None   Result Value Ref Range    Troponin I ES <0.015 0.000 - 0.045 ug/L   EKG 12-lead, tracing only     Status: None   Result Value Ref Range    Interpretation ECG Click View Image link to view waveform and result      XR Chest 2 Views   Final Result   IMPRESSION: No acute airspace disease.      JEANNE PALENCIA MD          Treatments provided: Monitoring, telemetry    Family Comments:  at bedside    OBS brochure/video discussed/provided to patient/family: No              Name of person given brochure if not patient: n/a              Relationship to patient: n/a    ED Medications: Medications - No data to display    Drips infusing?:  No    For the majority of the shift this patient was Green.   Interventions performed were n/a.    Severe Sepsis OR Septic Shock Diagnosis Present: No    To be done/followed up on inpatient unit:  Continue plan of care    ED NURSE PHONE NUMBER: [id number]

## 2019-11-15 NOTE — PHARMACY-ADMISSION MEDICATION HISTORY
Pharmacy Medication History  Admission medication history interview status for the 11/14/2019  admission is complete. See EPIC admission navigator for prior to admission medications     Medication history sources: Patient and family in the room  Medication history source reliability: Good, she had her list w/ her.  Adherence assessment: Good    Significant changes made to the medication list:  none      Additional medication history information:   none    Medication reconciliation completed by provider prior to medication history? No    Time spent in this activity: 15min      Prior to Admission medications    Medication Sig Last Dose Taking? Auth Provider   ATENOLOL PO Take 25 mg by mouth At Bedtime  11/13/2019 at hs Yes Reported, Patient   ATORVASTATIN CALCIUM PO Take 20 mg by mouth At Bedtime  11/13/2019 at hs Yes Reported, Patient   Clopidogrel Bisulfate (PLAVIX PO) Take 75 mg by mouth daily  11/14/2019 at Unknown time Yes Reported, Patient   fenofibrate micronized (LOFIBRA) 67 MG capsule Take 67 mg by mouth every morning (before breakfast) 11/14/2019 at Unknown time Yes Unknown, Entered By History   HYDROCHLOROTHIAZIDE PO Take 25 mg by mouth every morning  11/14/2019 at Unknown time Yes Reported, Patient   insulin glargine (LANTUS PEN) 100 UNIT/ML pen Inject 42 Units Subcutaneous every morning 11/14/2019 at Unknown time Yes Unknown, Entered By History   losartan (COZAAR) 50 MG tablet Take 50 mg by mouth 2 times daily 11/14/2019 at Unknown time Yes Unknown, Entered By History   metFORMIN (GLUCOPHAGE-XR) 500 MG 24 hr tablet Take 1,000 mg by mouth 2 times daily (with meals) 11/14/2019 at Unknown time Yes Unknown, Entered By History

## 2019-11-15 NOTE — H&P
[facility]    History and Physical - Hospitalist Service       Date of Admission:  11/14/2019    Assessment & Plan      Soledad Palomares is a 69 year old female with a history of stroke on Plavix, insulin-dependent diabetes mellitus, hypertension who presents with several days of left-sided chest pain and left hand numbness and tingling today.    Chest pain   -Presented with exertional chest pain for last several days with some atypical chest pain earlier today with left arm numbness  -Troponin on admission negative, trend troponin every 4 hours, EKG with with no acute abnormality  -Risk factors include previous stroke, diabetes and hypertension and age  -Continue PTA Plavix, stress test in the morning if troponin continues to remain negative  -Check lipid panel      Essential Hypertension   -PTA on losartan, atenolol, HCTZ  -Resume PTA losartan and HCTZ with holding parameters  -Holding PTA atenolol for stress test tomorrow  -Monitor and adjust medication as needed    Diabetes Mellitus type 2,  Insulin Dependent  -PTA on Lantus 42 units in the morning, metformin thousand milligrams twice daily  -Hemoglobin A1c per care everywhere on 9/12/2019 was 9.4  -Will place on 30 units of Lantus and sliding scale insulin to avoid hypoglycemia during hospital stay, holding PTA metformin  -Sliding scale insulin    History of stroke  PTA on Plavix, continue PTA Plavix     Diet: Diabetic, n.p.o. after midnight  DVT Prophylaxis: Pneumatic Compression Devices  Pavon Catheter: not present  Code Status: Full code    Disposition Plan   Expected discharge: Tomorrow, recommended to prior living arrangement once Cardiac evaluation completed and if normal.  Entered: Carolyn Bruner MD 11/14/2019, 7:12 PM     The patient's care was discussed with the Patient and Patient's Family.    Carolyn Bruner MD  [facility]    ______________________________________________________________________    Chief Complaint    Chest pain    History is obtained from the patient    History of Present Illness   Soledad Palomares is a 69 year old female with a history of stroke on Plavix, insulin-dependent diabetes mellitus, hypertension who presents with several days of left-sided chest pain and left hand numbness and tingling today.  The patient reports that this morning she woke up with chest pain, rubbed her chest and tried to lay on her left side.  She started noticing some numbness on her left hand going up her arm which lasted for about 5 minutes.  She spoke with her family members and finally decided to present to the ED.  The patient has been having intermittent left-sided chest pain for several days now which recently started getting worse with activity.  She has had stress test x3 in the past with last one 7 to 9 years ago.  She denies any shortness of breath, palpitation, dizziness, lightheadedness, nausea, vomiting, diaphoresis.    When she presented to the ED temperature was 97.7, pulse rate 67, blood pressure 138/103 which improved to 137/79, respiratory 16, O2 sat 90% on room air.  Labs showed unremarkable CBC and a BMP, troponin x1 was negative glucose is 179.  Chest x-ray shows no acute cardiopulmonary process.    Review of Systems    The 10 point Review of Systems is negative other than noted in the HPI or here.     Past Medical History    I have reviewed this patient's medical history and updated it with pertinent information if needed.   Past Medical History:   Diagnosis Date     Cerebral infarction (H)      Diabetes (H)      Hypertension      Myocardial infarction (H)        Past Surgical History   I have reviewed this patient's surgical history and updated it with pertinent information if needed.  Past Surgical History:   Procedure Laterality Date     CHOLECYSTECTOMY       ORTHOPEDIC SURGERY         Social History   I have reviewed this patient's social history and updated it with pertinent information if  needed.  Social History     Tobacco Use     Smoking status: Former Smoker     Smokeless tobacco: Never Used   Substance Use Topics     Alcohol use: Not Currently     Drug use: Never       Family History   Family history was reviewed and is noncontributory    Prior to Admission Medications   Prior to Admission Medications   Prescriptions Last Dose Informant Patient Reported? Taking?   ATENOLOL PO 11/13/2019 at hs  Yes Yes   Sig: Take 25 mg by mouth At Bedtime    ATORVASTATIN CALCIUM PO 11/13/2019 at hs  Yes Yes   Sig: Take 20 mg by mouth At Bedtime    Clopidogrel Bisulfate (PLAVIX PO) 11/14/2019 at Unknown time  Yes Yes   Sig: Take 75 mg by mouth daily    HYDROCHLOROTHIAZIDE PO 11/14/2019 at Unknown time  Yes Yes   Sig: Take 25 mg by mouth every morning    fenofibrate micronized (LOFIBRA) 67 MG capsule 11/14/2019 at Unknown time  Yes Yes   Sig: Take 67 mg by mouth every morning (before breakfast)   insulin glargine (LANTUS PEN) 100 UNIT/ML pen 11/14/2019 at Unknown time  Yes Yes   Sig: Inject 42 Units Subcutaneous every morning   losartan (COZAAR) 50 MG tablet 11/14/2019 at Unknown time  Yes Yes   Sig: Take 50 mg by mouth 2 times daily   metFORMIN (GLUCOPHAGE-XR) 500 MG 24 hr tablet 11/14/2019 at Unknown time  Yes Yes   Sig: Take 1,000 mg by mouth 2 times daily (with meals)      Facility-Administered Medications: None     Allergies   Allergies   Allergen Reactions     Lisinopril        Physical Exam   Vital Signs: Temp: 97.7  F (36.5  C) Temp src: Temporal BP: 137/79 Pulse: 67   Resp: 16 SpO2: 95 % O2 Device: None (Room air)    Weight: 190 lbs 0 oz    Exam:  Constitutional: Awake, alert and no distress. Appears comfortable  Head: Normocephalic. No masses, lesions, tenderness or abnormalities  ENT: ENT exam normal, no neck nodes or sinus tenderness  Cardiovascular: RRR.  no murmurs, no rubs or JVD  Respiratory:normal WOB,b/l equal air entry, no wheezes or crackles   Gastrointestinal: Abdomen soft, non-tender. BS  normal. No masses, organomegaly  : Deferred  Extremities :no edema , no clubbing or cyanosis    Neurologic: Cranial nerves II-XII grossly intact , power symmetrical, Reflexes normal and symmetric. Sensation grossly WNL.  Skin: Warm and dry to touch no rash    Data   Data reviewed today: I reviewed all medications, new labs and imaging results over the last 24 hours. I personally reviewed the chest x-ray image(s) showing No acute abnormality.    Recent Labs   Lab 11/14/19  1659   WBC 7.9   HGB 13.8   MCV 84         POTASSIUM 3.5   CHLORIDE 100   CO2 27   BUN 20   CR 0.72   ANIONGAP 6   ASHOK 8.8   *   TROPI <0.015     Recent Results (from the past 24 hour(s))   XR Chest 2 Views    Narrative    XR CHEST TWO VIEWS   11/14/2019 5:43 PM     HISTORY: Chest pain.    COMPARISON: Chest x-ray on 4/16/2019      Impression    IMPRESSION: No acute airspace disease.    JEANNE PALENCIA MD

## 2019-11-15 NOTE — PROGRESS NOTES
Observation goals PRIOR TO DISCHARGE     Comments: List all goals to be met before discharge home:   - Serial troponins and stress test complete. NOT MET  - Seen and cleared by consultant if applicable N/A  - Adequate pain control on oral analgesia MET  - Vital signs normal or at patient baseline MET  - Safe disposition plan has been identified NOT MET  - Nurse to notify provider when observation goals have been met and patient is ready for discharge.        Shift Note:  Pt. Arrived from ED with chest pain. VSS on Ra, ex slight htn. Tolerated mod cho diet, NPO at midnight for stress test in am. Up independent. Continent. Denies pain, SOB, dizziness, n/v. CMS/Skin intact. Trops negative, Tele NSR, A1C 8.8, BG's 190 and 234. Plan for NM Lexiscan stress test in am. Discharge pending. Continue to monitor.

## 2021-04-07 ENCOUNTER — APPOINTMENT (OUTPATIENT)
Dept: ULTRASOUND IMAGING | Facility: CLINIC | Age: 71
End: 2021-04-07
Attending: EMERGENCY MEDICINE
Payer: MEDICARE

## 2021-04-07 ENCOUNTER — HOSPITAL ENCOUNTER (EMERGENCY)
Facility: CLINIC | Age: 71
Discharge: HOME | End: 2021-04-08
Attending: EMERGENCY MEDICINE | Admitting: EMERGENCY MEDICINE
Payer: MEDICARE

## 2021-04-07 DIAGNOSIS — M79.662 PAIN OF LEFT LOWER LEG: ICD-10-CM

## 2021-04-07 LAB
ALBUMIN SERPL-MCNC: 4 G/DL (ref 3.4–5)
ALP SERPL-CCNC: 69 U/L (ref 40–150)
ALT SERPL W P-5'-P-CCNC: 25 U/L (ref 0–50)
ANION GAP SERPL CALCULATED.3IONS-SCNC: 6 MMOL/L (ref 3–14)
AST SERPL W P-5'-P-CCNC: 14 U/L (ref 0–45)
BASOPHILS # BLD AUTO: 0.1 10E9/L (ref 0–0.2)
BASOPHILS NFR BLD AUTO: 0.8 %
BILIRUB SERPL-MCNC: 0.4 MG/DL (ref 0.2–1.3)
BUN SERPL-MCNC: 25 MG/DL (ref 7–30)
CALCIUM SERPL-MCNC: 9.3 MG/DL (ref 8.5–10.1)
CHLORIDE SERPL-SCNC: 105 MMOL/L (ref 94–109)
CO2 SERPL-SCNC: 28 MMOL/L (ref 20–32)
CREAT SERPL-MCNC: 1.09 MG/DL (ref 0.52–1.04)
DIFFERENTIAL METHOD BLD: ABNORMAL
EOSINOPHIL # BLD AUTO: 0.8 10E9/L (ref 0–0.7)
EOSINOPHIL NFR BLD AUTO: 8.7 %
ERYTHROCYTE [DISTWIDTH] IN BLOOD BY AUTOMATED COUNT: 12.7 % (ref 10–15)
GFR SERPL CREATININE-BSD FRML MDRD: 51 ML/MIN/{1.73_M2}
GLUCOSE SERPL-MCNC: 139 MG/DL (ref 70–99)
HCT VFR BLD AUTO: 46.1 % (ref 35–47)
HGB BLD-MCNC: 14.5 G/DL (ref 11.7–15.7)
IMM GRANULOCYTES # BLD: 0 10E9/L (ref 0–0.4)
IMM GRANULOCYTES NFR BLD: 0.2 %
INR PPP: 1 (ref 0.86–1.14)
INTERPRETATION ECG - MUSE: NORMAL
LYMPHOCYTES # BLD AUTO: 3.8 10E9/L (ref 0.8–5.3)
LYMPHOCYTES NFR BLD AUTO: 43.1 %
MCH RBC QN AUTO: 27.3 PG (ref 26.5–33)
MCHC RBC AUTO-ENTMCNC: 31.5 G/DL (ref 31.5–36.5)
MCV RBC AUTO: 87 FL (ref 78–100)
MONOCYTES # BLD AUTO: 0.6 10E9/L (ref 0–1.3)
MONOCYTES NFR BLD AUTO: 6.7 %
NEUTROPHILS # BLD AUTO: 3.6 10E9/L (ref 1.6–8.3)
NEUTROPHILS NFR BLD AUTO: 40.5 %
NRBC # BLD AUTO: 0 10*3/UL
NRBC BLD AUTO-RTO: 0 /100
PLATELET # BLD AUTO: 325 10E9/L (ref 150–450)
POTASSIUM SERPL-SCNC: 3.8 MMOL/L (ref 3.4–5.3)
PROT SERPL-MCNC: 7.2 G/DL (ref 6.8–8.8)
RBC # BLD AUTO: 5.31 10E12/L (ref 3.8–5.2)
SODIUM SERPL-SCNC: 139 MMOL/L (ref 133–144)
TROPONIN I SERPL-MCNC: <0.015 UG/L (ref 0–0.04)
WBC # BLD AUTO: 8.9 10E9/L (ref 4–11)

## 2021-04-07 PROCEDURE — 80053 COMPREHEN METABOLIC PANEL: CPT | Performed by: EMERGENCY MEDICINE

## 2021-04-07 PROCEDURE — 99285 EMERGENCY DEPT VISIT HI MDM: CPT | Mod: 25

## 2021-04-07 PROCEDURE — 84484 ASSAY OF TROPONIN QUANT: CPT | Performed by: EMERGENCY MEDICINE

## 2021-04-07 PROCEDURE — 85025 COMPLETE CBC W/AUTO DIFF WBC: CPT | Performed by: EMERGENCY MEDICINE

## 2021-04-07 PROCEDURE — 93971 EXTREMITY STUDY: CPT | Mod: LT

## 2021-04-07 PROCEDURE — 93005 ELECTROCARDIOGRAM TRACING: CPT

## 2021-04-07 PROCEDURE — 85610 PROTHROMBIN TIME: CPT | Performed by: EMERGENCY MEDICINE

## 2021-04-07 PROCEDURE — 250N000013 HC RX MED GY IP 250 OP 250 PS 637: Performed by: EMERGENCY MEDICINE

## 2021-04-07 RX ORDER — ACETAMINOPHEN 500 MG
1000 TABLET ORAL ONCE
Status: COMPLETED | OUTPATIENT
Start: 2021-04-07 | End: 2021-04-07

## 2021-04-07 RX ADMIN — ACETAMINOPHEN 1000 MG: 500 TABLET, FILM COATED ORAL at 22:48

## 2021-04-07 ASSESSMENT — ENCOUNTER SYMPTOMS: NECK PAIN: 1

## 2021-04-07 ASSESSMENT — MIFFLIN-ST. JEOR: SCORE: 1255.25

## 2021-04-08 VITALS
BODY MASS INDEX: 31.47 KG/M2 | SYSTOLIC BLOOD PRESSURE: 159 MMHG | OXYGEN SATURATION: 97 % | DIASTOLIC BLOOD PRESSURE: 100 MMHG | TEMPERATURE: 97.3 F | HEIGHT: 62 IN | HEART RATE: 62 BPM | WEIGHT: 171 LBS | RESPIRATION RATE: 22 BRPM

## 2021-04-08 PROCEDURE — 250N000013 HC RX MED GY IP 250 OP 250 PS 637: Performed by: EMERGENCY MEDICINE

## 2021-04-08 RX ORDER — IBUPROFEN 400 MG/1
400 TABLET, FILM COATED ORAL ONCE
Status: COMPLETED | OUTPATIENT
Start: 2021-04-08 | End: 2021-04-08

## 2021-04-08 RX ADMIN — IBUPROFEN 400 MG: 400 TABLET ORAL at 01:33

## 2021-04-08 NOTE — ED PROVIDER NOTES
"  History   Chief Complaint:  Leg Pain       HPI   Soledad Palomares is a 70 year old female, anticoagulated with Plavix, with history of hypertension, diabetes, stroke, and bilateral total knee arthroplasty who presents with left leg pain.  The patient states that this morning, she woke up with anterior lower left leg pain and swelling.  She notes that she is unable to walk, as the pain increases with walking, and repeatedly states that she feels as though her \"veins are popping out.\"  She has been treating it by massaging her calf.  She denies any falls, ankle pain and calf pain.      She also notes that she has been having intermittent chest pain since 4/3 beginning at 0200 when it woke her up.  At that time she notes that it radiated into her neck, but has since resolved itself.  She is not having chest pain today.     Review of Systems   Cardiovascular: Positive for chest pain (intermittent) and leg swelling.   Musculoskeletal: Positive for neck pain (resolved).        No ankle pain  No calf pain   All other systems reviewed and are negative.      Allergies:  Lisinopril    Medications:  Atenolol  Atorvastatin  Plavix  Lofibra  Hydrochlorothiazide  Insulin  Cozaar  Metformin  Omeprazole    Past Medical History:    Cerebral infarction  Diabetes  Hypertension  MI  CKD  Peripheral neuropathy  Hypertriglyceridemia    Past Surgical History:    Cholecystectomy  Total knee arthroplasty, bilateral    Family History:    Father: diabetes     Social History:  Patient presents to ED with her .  She likes to go on walks when the weather is nice.    Physical Exam     Patient Vitals for the past 24 hrs:   BP Temp Temp src Pulse Resp SpO2 Height Weight   04/07/21 2219 -- -- -- 60 19 98 % -- --   04/07/21 2207 (!) 176/95 -- -- 62 -- -- -- --   04/07/21 1912 126/67 97.3  F (36.3  C) Temporal 67 16 98 % 1.585 m (5' 2.4\") 77.6 kg (171 lb)       Physical Exam  General: Appears well-developed and well-nourished.   Head: No " signs of trauma.   CV: Normal rate and regular rhythm.    Resp: Effort normal and breath sounds normal. No respiratory distress.   GI: Soft. There is no tenderness.  No rebound or guarding.  Normal bowel sounds.   MSK: Normal range of motion. no edema. No Calf tenderness.  2+ DP and PT pulses.  Neuro: The patient is alert and oriented.  Strength in lower extremities normal and symmetrical. Sensation normal. Speech normal.  GCS 15  Skin: Skin is warm and dry. No rash noted.   Psych: normal mood and affect. behavior is normal.       Emergency Department Course   ECG  ECG taken at 2235, ECG read at 2238  Sinus bradycardia with sinus arrhythmia  Left axis deviation  Inferior infarct, age undetermined   Abnormal ECG  Rate 57 bpm. OH interval 180 ms. QRS duration 90 ms. QT/QTc 448/436 ms. P-R-T axes 44 -46 56.     Imaging:  US Lower Extremity Venous Duplex Left  Final Result  IMPRESSION:  1.  No deep venous thrombosis in the left lower extremity.    As read by radiology.    Laboratory:  Troponin (Collected 2220): <0.015  CBC: WBC 8.9, HGB 14.5,    CMP: Glucose: 139(H), Creatinine: 1.09(H), GFR: 51(L) o/w WNL  INR: 1.00       Emergency Department Course:    Reviewed:  I reviewed nursing notes, vitals, past medical history and care everywhere    Assessments:  2222 I obtained history and examined the patient as noted above.   0120 I rechecked the patient and explained findings.     Interventions:  2248 Tylenol 1000 mg PO  0133 Ibuprofen 400 mg PO    Disposition:  The patient was discharged home.      Impression & Plan     CMS Diagnoses: None    Medical Decision Making:  Soledad Palomares is a 70-year-old woman who presents due to left leg pain.  She states that she had woken up with feeling that the anterior portion of the left shin was somewhat swollen and painful particularly with walking.  She states that she has been walking more outside since the weather is starting to get warmer.  During my evaluation she had  discussed having some chest pain, but this seems to be a longstanding issue and the pain was a number of days ago and not currently present.  Upon evaluation there is no signs of trauma or injury.  She is neurovascularly intact with appropriate pulses distally.  There is no calf pain or swelling.  Blood work was obtained that was reassuring and ultrasound of the left lower extremity did not show any signs of DVT or superficial thrombophlebitis.  Patient was given Tylenol and ibuprofen and had resolution of her pain and was able to ambulate without difficulty.  Given the reassuring work-up I feel patient appropriate for discharge home.  It is certainly possible that she may have strained the leg due to the increased physical activity recently.  She is recommended to follow the primary care doctor the symptoms persist and to return to the ER for any new or concerning symptoms.      Diagnosis:    ICD-10-CM    1. Pain of left lower leg  M79.662        Scribe Disclosure:  I, Maria Elena Mc, am serving as a scribe at 10:32 PM on 4/7/2021 to document services personally performed by Jeronimo Domínguez MD based on my observations and the provider's statements to me.     [facility]       Jeronimo Domínguez MD  04/09/21 0138

## 2021-04-08 NOTE — ED TRIAGE NOTES
Pt reports left lower leg pain that started last night, states it is painful to touch and to walk, able to bear weight

## 2023-11-13 ENCOUNTER — APPOINTMENT (OUTPATIENT)
Dept: GENERAL RADIOLOGY | Facility: CLINIC | Age: 73
End: 2023-11-13
Attending: EMERGENCY MEDICINE
Payer: COMMERCIAL

## 2023-11-13 ENCOUNTER — HOSPITAL ENCOUNTER (EMERGENCY)
Facility: CLINIC | Age: 73
Discharge: HOME | End: 2023-11-14
Attending: EMERGENCY MEDICINE | Admitting: EMERGENCY MEDICINE
Payer: COMMERCIAL

## 2023-11-13 DIAGNOSIS — R55 VASOVAGAL NEAR SYNCOPE: ICD-10-CM

## 2023-11-13 DIAGNOSIS — Z86.16 PERSONAL HISTORY OF COVID-19: ICD-10-CM

## 2023-11-13 PROBLEM — I63.9 CEREBROVASCULAR ACCIDENT (CVA) (H): Status: ACTIVE | Noted: 2018-06-26

## 2023-11-13 PROBLEM — N18.31 TYPE 2 DIABETES MELLITUS WITH STAGE 3A CHRONIC KIDNEY DISEASE, WITH LONG-TERM CURRENT USE OF INSULIN (H): Status: ACTIVE | Noted: 2021-03-11

## 2023-11-13 PROBLEM — E11.22 TYPE 2 DIABETES MELLITUS WITH STAGE 3A CHRONIC KIDNEY DISEASE, WITH LONG-TERM CURRENT USE OF INSULIN (H): Status: ACTIVE | Noted: 2021-03-11

## 2023-11-13 PROBLEM — E11.42 DIABETIC PERIPHERAL NEUROPATHY (H): Status: ACTIVE | Noted: 2020-12-10

## 2023-11-13 PROBLEM — I48.91 ATRIAL FIBRILLATION, UNSPECIFIED TYPE (H): Status: ACTIVE | Noted: 2023-09-14

## 2023-11-13 PROBLEM — N18.30 CKD (CHRONIC KIDNEY DISEASE) STAGE 3, GFR 30-59 ML/MIN (H): Status: ACTIVE | Noted: 2020-09-13

## 2023-11-13 PROBLEM — E78.1 HYPERTRIGLYCERIDEMIA: Status: ACTIVE | Noted: 2018-06-26

## 2023-11-13 PROBLEM — E78.5 HYPERLIPIDEMIA: Status: ACTIVE | Noted: 2018-06-26

## 2023-11-13 PROBLEM — Z79.01 ANTICOAGULATION MONITORING, INR RANGE 2-3: Status: ACTIVE | Noted: 2023-10-04

## 2023-11-13 PROBLEM — Z79.4 TYPE 2 DIABETES MELLITUS WITH STAGE 3A CHRONIC KIDNEY DISEASE, WITH LONG-TERM CURRENT USE OF INSULIN (H): Status: ACTIVE | Noted: 2021-03-11

## 2023-11-13 LAB
ALBUMIN SERPL BCG-MCNC: 4.5 G/DL (ref 3.5–5.2)
ALP SERPL-CCNC: 76 U/L (ref 35–104)
ALT SERPL W P-5'-P-CCNC: 19 U/L (ref 0–50)
ANION GAP SERPL CALCULATED.3IONS-SCNC: 12 MMOL/L (ref 7–15)
AST SERPL W P-5'-P-CCNC: 19 U/L (ref 0–45)
ATRIAL RATE - MUSE: 56 BPM
BASOPHILS # BLD AUTO: 0 10E3/UL (ref 0–0.2)
BASOPHILS NFR BLD AUTO: 1 %
BILIRUB SERPL-MCNC: 0.3 MG/DL
BUN SERPL-MCNC: 26.7 MG/DL (ref 8–23)
CALCIUM SERPL-MCNC: 9.6 MG/DL (ref 8.8–10.2)
CHLORIDE SERPL-SCNC: 103 MMOL/L (ref 98–107)
CREAT SERPL-MCNC: 1.14 MG/DL (ref 0.51–0.95)
DEPRECATED HCO3 PLAS-SCNC: 26 MMOL/L (ref 22–29)
DIASTOLIC BLOOD PRESSURE - MUSE: NORMAL MMHG
EGFRCR SERPLBLD CKD-EPI 2021: 51 ML/MIN/1.73M2
EOSINOPHIL # BLD AUTO: 0.1 10E3/UL (ref 0–0.7)
EOSINOPHIL NFR BLD AUTO: 2 %
ERYTHROCYTE [DISTWIDTH] IN BLOOD BY AUTOMATED COUNT: 11.9 % (ref 10–15)
GLUCOSE SERPL-MCNC: 163 MG/DL (ref 70–99)
HCT VFR BLD AUTO: 43.4 % (ref 35–47)
HGB BLD-MCNC: 13.8 G/DL (ref 11.7–15.7)
IMM GRANULOCYTES # BLD: 0 10E3/UL
IMM GRANULOCYTES NFR BLD: 1 %
INR PPP: 1.71 (ref 0.85–1.15)
INTERPRETATION ECG - MUSE: NORMAL
LYMPHOCYTES # BLD AUTO: 2.7 10E3/UL (ref 0.8–5.3)
LYMPHOCYTES NFR BLD AUTO: 34 %
MCH RBC QN AUTO: 27.5 PG (ref 26.5–33)
MCHC RBC AUTO-ENTMCNC: 31.8 G/DL (ref 31.5–36.5)
MCV RBC AUTO: 87 FL (ref 78–100)
MONOCYTES # BLD AUTO: 0.7 10E3/UL (ref 0–1.3)
MONOCYTES NFR BLD AUTO: 9 %
NEUTROPHILS # BLD AUTO: 4.3 10E3/UL (ref 1.6–8.3)
NEUTROPHILS NFR BLD AUTO: 53 %
NRBC # BLD AUTO: 0 10E3/UL
NRBC BLD AUTO-RTO: 0 /100
P AXIS - MUSE: 49 DEGREES
PLATELET # BLD AUTO: 337 10E3/UL (ref 150–450)
POTASSIUM SERPL-SCNC: 3.9 MMOL/L (ref 3.4–5.3)
PR INTERVAL - MUSE: 168 MS
PROT SERPL-MCNC: 7.1 G/DL (ref 6.4–8.3)
QRS DURATION - MUSE: 84 MS
QT - MUSE: 414 MS
QTC - MUSE: 399 MS
R AXIS - MUSE: -29 DEGREES
RBC # BLD AUTO: 5.02 10E6/UL (ref 3.8–5.2)
SODIUM SERPL-SCNC: 141 MMOL/L (ref 135–145)
SYSTOLIC BLOOD PRESSURE - MUSE: NORMAL MMHG
T AXIS - MUSE: -58 DEGREES
TROPONIN T SERPL HS-MCNC: 10 NG/L
TROPONIN T SERPL HS-MCNC: 11 NG/L
VENTRICULAR RATE- MUSE: 56 BPM
WBC # BLD AUTO: 7.8 10E3/UL (ref 4–11)

## 2023-11-13 PROCEDURE — 99285 EMERGENCY DEPT VISIT HI MDM: CPT | Mod: 25

## 2023-11-13 PROCEDURE — 80053 COMPREHEN METABOLIC PANEL: CPT | Performed by: EMERGENCY MEDICINE

## 2023-11-13 PROCEDURE — 36415 COLL VENOUS BLD VENIPUNCTURE: CPT | Performed by: EMERGENCY MEDICINE

## 2023-11-13 PROCEDURE — 96360 HYDRATION IV INFUSION INIT: CPT

## 2023-11-13 PROCEDURE — 85025 COMPLETE CBC W/AUTO DIFF WBC: CPT | Performed by: EMERGENCY MEDICINE

## 2023-11-13 PROCEDURE — 85610 PROTHROMBIN TIME: CPT | Performed by: EMERGENCY MEDICINE

## 2023-11-13 PROCEDURE — 71045 X-RAY EXAM CHEST 1 VIEW: CPT

## 2023-11-13 PROCEDURE — 93005 ELECTROCARDIOGRAM TRACING: CPT

## 2023-11-13 PROCEDURE — 258N000003 HC RX IP 258 OP 636: Performed by: EMERGENCY MEDICINE

## 2023-11-13 PROCEDURE — 84484 ASSAY OF TROPONIN QUANT: CPT | Performed by: EMERGENCY MEDICINE

## 2023-11-13 RX ADMIN — SODIUM CHLORIDE 1000 ML: 9 INJECTION, SOLUTION INTRAVENOUS at 23:36

## 2023-11-13 ASSESSMENT — ACTIVITIES OF DAILY LIVING (ADL)
ADLS_ACUITY_SCORE: 35

## 2023-11-14 VITALS
TEMPERATURE: 97.1 F | HEIGHT: 62 IN | RESPIRATION RATE: 17 BRPM | BODY MASS INDEX: 31.28 KG/M2 | SYSTOLIC BLOOD PRESSURE: 150 MMHG | OXYGEN SATURATION: 99 % | HEART RATE: 68 BPM | WEIGHT: 170 LBS | DIASTOLIC BLOOD PRESSURE: 71 MMHG

## 2023-11-14 NOTE — ED TRIAGE NOTES
Pt reports palpitations and intermittent chest pains x3 weeks. Pt reports hypotension and bradycardia this morning, blood pressure of 60/40 and HR in 40s.      Triage Assessment (Adult)       Row Name 11/13/23 0140          Triage Assessment    Airway WDL WDL        Respiratory WDL    Respiratory WDL WDL        Skin Circulation/Temperature WDL    Skin Circulation/Temperature WDL WDL        Cardiac WDL    Cardiac WDL X  intermittent chest pain for the past 3 weeks        Peripheral/Neurovascular WDL    Peripheral Neurovascular WDL WDL        Cognitive/Neuro/Behavioral WDL    Cognitive/Neuro/Behavioral WDL WDL

## 2023-11-14 NOTE — ED PROVIDER NOTES
History     Chief Complaint:  Palpitations and Chest Pain       The history is provided by the patient.      Soledad Palomares is a 73 year old female who presents to the ED for evaluation of chest pain and low blood pressure. Patient currently takes warfarin for atrial fibrillation. Patient last night at around 2000 after her shower suddenly began to have a mild headache, shiver and felt lightheaded. She additionally reports feeling like she was about to fall. Patient went to check her blood pressure and had a low systolic blood pressure in the 60s and her heart rate was 42 bpm. Patient reports she put some salt on a lee ann in an attempt to raise blood pressure which she felt helped. She reported a blood pressure of 89/61 today in the morning. Headache had also resolved on its own. Patient got her INR results back from an anticoagulation therapy visit on 11/10, which was 2.2. Patient was informed to not take warfarin today and take 1 pill tomorrow. She then talked to the nurse triage line and was recommended she present to the ED. Patient was Covid positive on 11/6 but reports she feels recovered from this mild illness. Patient recently traveled outside the country in October to [facility]. Denies chest pain, shortness of breath, fever. No numbness, tingling, or weakness. No vomiting. She reports she largely feels back to normal now. She denies any other symptoms.     Independent Historian:   None - Patient Only    Review of External Notes:  I reviewed the patient's primary care clinic notes from 10/3/23:  Patient with problems below, patient here to follow-up before her trip to [facility], she will be leaving on Thursday.  Patient has been taking aspirin instead of Xarelto because she cannot afford it.  She did decide that she would rather do warfarin.  I did refer her to the INR clinic, she says they never got back to her.  I did have her see the diabetic educator and her insulin was adjusted and she says she is  "feeling much better with the higher dose      ICD-10-CM     1. Type 2 diabetes mellitus with stage 3a chronic kidney disease, with long-term current use of insulin (HC)  E11.22       N18.31       Z79.4       the insulin was increased 35 to 40 u once a day, the sugar has improved and she lost some weight       2. Atrial fibrillation, unspecified type (HC)          ROS:  See HPI    Allergies:  Empagliflozin  Insulin Glargine  Lisinopril  Metformin     Physical Exam   Patient Vitals for the past 24 hrs:   BP Temp Temp src Pulse Resp SpO2 Height Weight   11/14/23 0012 (!) 150/71 -- -- 68 17 99 % -- --   11/13/23 2158 -- -- -- -- 14 -- -- --   11/13/23 2148 136/77 -- -- 59 -- -- -- --   11/13/23 2128 (!) 154/92 -- -- 57 16 -- -- --   11/13/23 1843 (!) 172/75 97.1  F (36.2  C) Temporal 57 16 100 % 1.575 m (5' 2\") 77.1 kg (170 lb)        Physical Exam  General: Well appearing, nontoxic. Resting comfortably  Head:  Scalp, face, and head appear normal  Eyes:  Pupils are equal, round    Conjunctivae non-injected and sclerae white  ENT:    The external nose is normal    Pinnae are normal  Neck:  Normal range of motion    There is no rigidity noted    Trachea is in the midline  CV:  Regular rate and rhythm     Normal S1/S2, no S3/S4    No murmur or rub. Radial pulses 2+ bilaterally.  Resp:  Lungs are clear and equal bilaterally  There is no tachypnea    No increased work of breathing    No rales, wheezing, or rhonchi  GI:  Abdomen is soft, no rigidity or guarding    No distension, or mass    No tenderness or rebound tenderness   MS:  Normal muscular tone    Symmetric motor strength    No lower extremity edema. No calf swelling or tenderness.  Skin:  No rash or acute skin lesions noted  Neuro:  Awake and alert, oriented appropriately.  Strength is 5 out of 5 and intact throughout.  Speech is normal and fluent  Moves all extremities spontaneously  Psych: Normal affect. Appropriate interactions.     Emergency Department Course "   ECG  ECG taken at 1631, ECG read at 2119  Sinus bradycardia wit sinus arrhthymia  T wave abnormality, consider anterolateral ischmia     Rate 56 bpm. IN interval 168 ms. QRS duration 84 ms. QT/QTc 414/399 ms. P-R-T axes 49 -29 -58.       Imaging:  XR Chest Port 1 View   Final Result   IMPRESSION: Heart size and pulmonary vascularity normal. The lungs are clear. Hypertrophic changes thoracic spine.         Report per radiology    Laboratory:  Labs Ordered and Resulted from Time of ED Arrival to Time of ED Departure   COMPREHENSIVE METABOLIC PANEL - Abnormal       Result Value    Sodium 141      Potassium 3.9      Carbon Dioxide (CO2) 26      Anion Gap 12      Urea Nitrogen 26.7 (*)     Creatinine 1.14 (*)     GFR Estimate 51 (*)     Calcium 9.6      Chloride 103      Glucose 163 (*)     Alkaline Phosphatase 76      AST 19      ALT 19      Protein Total 7.1      Albumin 4.5      Bilirubin Total 0.3     INR - Abnormal    INR 1.71 (*)    TROPONIN T, HIGH SENSITIVITY - Normal    Troponin T, High Sensitivity 11     TROPONIN T, HIGH SENSITIVITY - Normal    Troponin T, High Sensitivity 10     CBC WITH PLATELETS AND DIFFERENTIAL    WBC Count 7.8      RBC Count 5.02      Hemoglobin 13.8      Hematocrit 43.4      MCV 87      MCH 27.5      MCHC 31.8      RDW 11.9      Platelet Count 337      % Neutrophils 53      % Lymphocytes 34      % Monocytes 9      % Eosinophils 2      % Basophils 1      % Immature Granulocytes 1      NRBCs per 100 WBC 0      Absolute Neutrophils 4.3      Absolute Lymphocytes 2.7      Absolute Monocytes 0.7      Absolute Eosinophils 0.1      Absolute Basophils 0.0      Absolute Immature Granulocytes 0.0      Absolute NRBCs 0.0        Emergency Department Course & Assessments:      Interventions:  Medications   sodium chloride 0.9% BOLUS 1,000 mL (0 mLs Intravenous Stopped 11/14/23 0021)        Assessments, Independent Interpretation, Consult/Discussion of ManagementTests:  ED Course as of 11/14/23 9240    Mon Nov 13, 2023 2119 I obtained the history and examined the patient as noted above.   2312 On my independent interpretation of the patient's chest radiograph(s) there is no pneumothorax or large pleural effusions.    Tue Nov 14, 2023   0015 I rechecked and updated the patient. Patient was comfortable with discharge plan.       Social Determinants of Health affecting care:  None    Disposition:  The patient was discharged to home.     Impression & Plan      Medical Decision Making:  Soledad Palomares is a 73 year old female who presents to the ED for evaluation of an episode of feeling unwell, near syncope during which time she had a low blood pressure and pulse reading.  On my evaluation the patient is well-appearing, hemodynamically stable and afebrile.  No neurologic deficits.  No concerning headache at this time.  ED evaluation is reassuring.  EKG without evidence of ischemia or dysrhythmia.  No evidence of atrial fibrillation at this time.  Patient is on warfarin for secondary stroke prevention.  She is recently recovering from COVID-19 infection.  At this time is no evidence of secondary bacterial infection, pneumonia or other respiratory disease.  Remainder of her work-up is otherwise reassuring.  Creatinine is mildly elevated above baseline and she may be dehydrated.  She was treated with IV fluids.  High-sensitivity troponin is within normal limits x2 with no significant rise.  CBC is unremarkable.  Patient is now asymptomatic.  She is stable for discharge home with close outpatient primary care follow-up.  I recommended she restart her normal home medications tomorrow.  Patient is agreeable and she was discharged in stable condition.    Diagnosis:    ICD-10-CM    1. Vasovagal near syncope  R55       2. Personal history of COVID-19  Z86.16            Discharge Medications:  Discharge Medication List as of 11/14/2023 12:22 AM         Scribe Disclosure:  Steve MELENDEZ, am serving as a scribe at 9:59 PM on  11/13/2023 to document services personally performed by Edd Mejía MD based on my observations and the provider's statements to me.    11/13/2023   Edd Mejía MD       Historical Data:  ______________________________________________________________________  Medications:    Atenolol  Atorvastatin  Plavix  Loribra  Cozaar  Prilosec  Warfarin     Past Medical History:   Past Surgical History:     Atrial fibrillation  Cerebral infarction  Diabetes mellitus   Hypertension   Myocardial infarction  Cholecystectomy   Orthopedic surgery          Patient Active Problem List    Diagnosis Date Noted    Anticoagulation monitoring, INR range 2-3 10/04/2023     Priority: Medium    Atrial fibrillation, unspecified type (H) 09/14/2023     Priority: Medium    Type 2 diabetes mellitus with stage 3a chronic kidney disease, with long-term current use of insulin (H) 03/11/2021     Priority: Medium     Formatting of this note might be different from the original. pt saw endocrinology, she was started on jardiance and ozempic Formatting of this note might be different from the original. Formatting of this note might be different from the original. pt saw endocrinology, she was started on jardiance and ozempic      Diabetic peripheral neuropathy (H) 12/10/2020     Priority: Medium     Formatting of this note might be different from the original. both feet, biting pains Formatting of this note might be different from the original. Formatting of this note might be different from the original. both feet, biting pains      CKD (chronic kidney disease) stage 3, GFR 30-59 ml/min (H) 09/13/2020     Priority: Medium    Cerebrovascular accident (CVA) (H) 06/26/2018     Priority: Medium     Formatting of this note is different from the original. 4/2017, now only has a residual neuropathy right outer leg, no weakness or difficulty with ambulation See note of 3/27/22 Hi, It does not seem that I had seen this patient before.  However, it  looks like she had history of CVA in the past.  Studies have shown that Plavix is better than aspirin.  She may continue on Plavix rather than aspirin.   Deven Will continue the plavix life long Formatting of this note is different from the original. Formatting of this note is different from the original. 4/2017, now only has a residual neuropathy right outer leg, no weakness or difficulty with ambulation See note of 3/27/22 Hi, It does not seem that I had seen this patient before.  However, it looks like she had history of CVA in the past.  Studies have shown that Plavix is better than aspirin.  She may continue on Plavix rather than aspirin.   Deven Will continue the plavix life long      Hyperlipidemia 06/26/2018     Priority: Medium     Formatting of this note might be different from the original. she is currently on lipitor and fenofibrate Formatting of this note might be different from the original. Formatting of this note might be different from the original. she is currently on lipitor and fenofibrate      Hypertriglyceridemia 06/26/2018     Priority: Medium     Formatting of this note might be different from the original. she has been taking the medications as prescribed and tolerating well      HTN (hypertension) 04/08/2014     Priority: Medium     Formatting of this note might be different from the original. taking the medications as prescribed. tolerating well, bp still borderline Formatting of this note might be different from the original. Formatting of this note might be different from the original. taking the medications as prescribed. tolerating well, bp still borderline            Family History:    family history is not on file. Social History:   reports that she has quit smoking. She has never used smokeless tobacco. She reports that she does not currently use alcohol. She reports that she does not use drugs.     PCP: Audrey Martinez Ryan Clay, MD  11/14/23 3095

## 2025-02-12 ENCOUNTER — HOSPITAL ENCOUNTER (EMERGENCY)
Facility: CLINIC | Age: 75
Discharge: HOME | End: 2025-02-12
Attending: SOCIAL WORKER | Admitting: SOCIAL WORKER
Payer: COMMERCIAL

## 2025-02-12 VITALS
OXYGEN SATURATION: 99 % | DIASTOLIC BLOOD PRESSURE: 89 MMHG | SYSTOLIC BLOOD PRESSURE: 143 MMHG | BODY MASS INDEX: 31.83 KG/M2 | HEART RATE: 63 BPM | HEIGHT: 62 IN | WEIGHT: 173 LBS | RESPIRATION RATE: 18 BRPM | TEMPERATURE: 97.7 F

## 2025-02-12 DIAGNOSIS — R07.89 ATYPICAL CHEST PAIN: ICD-10-CM

## 2025-02-12 DIAGNOSIS — I10 HYPERTENSION, UNSPECIFIED TYPE: ICD-10-CM

## 2025-02-12 DIAGNOSIS — Z79.01 ANTICOAGULATED: ICD-10-CM

## 2025-02-12 LAB
ANION GAP SERPL CALCULATED.3IONS-SCNC: 13 MMOL/L (ref 7–15)
ATRIAL RATE - MUSE: 60 BPM
ATRIAL RATE - MUSE: 64 BPM
BASOPHILS # BLD AUTO: 0.1 10E3/UL (ref 0–0.2)
BASOPHILS NFR BLD AUTO: 1 %
BUN SERPL-MCNC: 35.1 MG/DL (ref 8–23)
CALCIUM SERPL-MCNC: 9.8 MG/DL (ref 8.8–10.4)
CHLORIDE SERPL-SCNC: 103 MMOL/L (ref 98–107)
CREAT SERPL-MCNC: 1.03 MG/DL (ref 0.51–0.95)
D DIMER PPP FEU-MCNC: <0.27 UG/ML FEU (ref 0–0.5)
DIASTOLIC BLOOD PRESSURE - MUSE: NORMAL MMHG
DIASTOLIC BLOOD PRESSURE - MUSE: NORMAL MMHG
EGFRCR SERPLBLD CKD-EPI 2021: 57 ML/MIN/1.73M2
EOSINOPHIL # BLD AUTO: 0.8 10E3/UL (ref 0–0.7)
EOSINOPHIL NFR BLD AUTO: 8 %
ERYTHROCYTE [DISTWIDTH] IN BLOOD BY AUTOMATED COUNT: 12.8 % (ref 10–15)
GLUCOSE SERPL-MCNC: 164 MG/DL (ref 70–99)
HCO3 SERPL-SCNC: 27 MMOL/L (ref 22–29)
HCT VFR BLD AUTO: 41.9 % (ref 35–47)
HGB BLD-MCNC: 14 G/DL (ref 11.7–15.7)
HOLD SPECIMEN: NORMAL
IMM GRANULOCYTES # BLD: 0 10E3/UL
IMM GRANULOCYTES NFR BLD: 0 %
INR PPP: 2.35 (ref 0.85–1.15)
INTERPRETATION ECG - MUSE: NORMAL
INTERPRETATION ECG - MUSE: NORMAL
LYMPHOCYTES # BLD AUTO: 3.1 10E3/UL (ref 0.8–5.3)
LYMPHOCYTES NFR BLD AUTO: 30 %
MCH RBC QN AUTO: 28.3 PG (ref 26.5–33)
MCHC RBC AUTO-ENTMCNC: 33.4 G/DL (ref 31.5–36.5)
MCV RBC AUTO: 85 FL (ref 78–100)
MONOCYTES # BLD AUTO: 0.6 10E3/UL (ref 0–1.3)
MONOCYTES NFR BLD AUTO: 6 %
NEUTROPHILS # BLD AUTO: 5.7 10E3/UL (ref 1.6–8.3)
NEUTROPHILS NFR BLD AUTO: 55 %
NRBC # BLD AUTO: 0 10E3/UL
NRBC BLD AUTO-RTO: 0 /100
P AXIS - MUSE: 37 DEGREES
P AXIS - MUSE: 43 DEGREES
PLATELET # BLD AUTO: 310 10E3/UL (ref 150–450)
POTASSIUM SERPL-SCNC: 3.9 MMOL/L (ref 3.4–5.3)
PR INTERVAL - MUSE: 174 MS
PR INTERVAL - MUSE: 176 MS
QRS DURATION - MUSE: 92 MS
QRS DURATION - MUSE: 94 MS
QT - MUSE: 424 MS
QT - MUSE: 446 MS
QTC - MUSE: 437 MS
QTC - MUSE: 446 MS
R AXIS - MUSE: -55 DEGREES
R AXIS - MUSE: -56 DEGREES
RBC # BLD AUTO: 4.95 10E6/UL (ref 3.8–5.2)
SODIUM SERPL-SCNC: 143 MMOL/L (ref 135–145)
SYSTOLIC BLOOD PRESSURE - MUSE: NORMAL MMHG
SYSTOLIC BLOOD PRESSURE - MUSE: NORMAL MMHG
T AXIS - MUSE: 71 DEGREES
T AXIS - MUSE: 95 DEGREES
TROPONIN T SERPL HS-MCNC: 10 NG/L
TROPONIN T SERPL HS-MCNC: 7 NG/L
TROPONIN T SERPL HS-MCNC: 8 NG/L
VENTRICULAR RATE- MUSE: 60 BPM
VENTRICULAR RATE- MUSE: 64 BPM
WBC # BLD AUTO: 10.3 10E3/UL (ref 4–11)

## 2025-02-12 PROCEDURE — 85025 COMPLETE CBC W/AUTO DIFF WBC: CPT | Performed by: SOCIAL WORKER

## 2025-02-12 PROCEDURE — 93005 ELECTROCARDIOGRAM TRACING: CPT

## 2025-02-12 PROCEDURE — 36415 COLL VENOUS BLD VENIPUNCTURE: CPT | Performed by: SOCIAL WORKER

## 2025-02-12 PROCEDURE — 85379 FIBRIN DEGRADATION QUANT: CPT | Performed by: SOCIAL WORKER

## 2025-02-12 PROCEDURE — 85610 PROTHROMBIN TIME: CPT | Performed by: SOCIAL WORKER

## 2025-02-12 PROCEDURE — 250N000013 HC RX MED GY IP 250 OP 250 PS 637: Performed by: SOCIAL WORKER

## 2025-02-12 PROCEDURE — 80048 BASIC METABOLIC PNL TOTAL CA: CPT | Performed by: SOCIAL WORKER

## 2025-02-12 PROCEDURE — 93005 ELECTROCARDIOGRAM TRACING: CPT | Mod: 76

## 2025-02-12 PROCEDURE — 99285 EMERGENCY DEPT VISIT HI MDM: CPT | Mod: 25

## 2025-02-12 PROCEDURE — 84484 ASSAY OF TROPONIN QUANT: CPT | Performed by: SOCIAL WORKER

## 2025-02-12 RX ORDER — MAGNESIUM HYDROXIDE/ALUMINUM HYDROXICE/SIMETHICONE 120; 1200; 1200 MG/30ML; MG/30ML; MG/30ML
15 SUSPENSION ORAL ONCE
Status: COMPLETED | OUTPATIENT
Start: 2025-02-12 | End: 2025-02-12

## 2025-02-12 RX ORDER — ACETAMINOPHEN 500 MG
1000 TABLET ORAL ONCE
Status: COMPLETED | OUTPATIENT
Start: 2025-02-12 | End: 2025-02-12

## 2025-02-12 RX ADMIN — ACETAMINOPHEN 1000 MG: 500 TABLET, FILM COATED ORAL at 10:23

## 2025-02-12 RX ADMIN — ALUMINUM HYDROXIDE, MAGNESIUM HYDROXIDE, AND SIMETHICONE 15 ML: 200; 200; 20 SUSPENSION ORAL at 13:41

## 2025-02-12 ASSESSMENT — ACTIVITIES OF DAILY LIVING (ADL)
ADLS_ACUITY_SCORE: 46

## 2025-02-12 ASSESSMENT — COLUMBIA-SUICIDE SEVERITY RATING SCALE - C-SSRS
6. HAVE YOU EVER DONE ANYTHING, STARTED TO DO ANYTHING, OR PREPARED TO DO ANYTHING TO END YOUR LIFE?: NO
1. IN THE PAST MONTH, HAVE YOU WISHED YOU WERE DEAD OR WISHED YOU COULD GO TO SLEEP AND NOT WAKE UP?: NO
2. HAVE YOU ACTUALLY HAD ANY THOUGHTS OF KILLING YOURSELF IN THE PAST MONTH?: NO

## 2025-02-12 NOTE — ED PROVIDER NOTES
"  Emergency Department Note      History of Present Illness     Chief Complaint   Chest Pain    HPI   Soledad Palomares is a 74 year old female on Coumadin with a history of hypertension, hyperlipidemia, myocardial infarction, atrial fibrillation, CVA, and type 2 diabetes mellitus presenting to the ED for evaluation of chest pain. The patient reports that she has been having chest pain intermittently throughout the past week. The pain was more intense last night after eating dinner. She reports the pain as intermittent tight squeezing in the epigastric area with some radiation to the right under her breast, lasting about 30 seconds and when this sensation comes on she feels short of breath.  She also mentions right shoulder pain, but this is not new. She has not missed any doses of her Coumadin. No hemoptysis, black/bloody stools, no new cough, runny nose, sore throat, or fever.  No swelling in her legs.    Independent Historian   None    Review of External Notes   I reviewed the anticoagulation visit from 2/1/25.     Past Medical History     Medical History and Problem List   Hypertension  Myocardial infarction   Atrial fibrillation, unspecified type   Cerebrovascular accident (CVA)   CKD (chronic kidney disease) stage 3  Diabetic peripheral neuropathy   Hyperlipidemia  Hypertriglyceridemia  Type 2 diabetes mellitus with stage 3a chronic kidney disease    Medications   Atenolol  Atorvastatin calcium  Lofibra  Hydrochlorothiazide  Lantus   Cozaar  Glucophage XR  Prilosec   Coumadin     Surgical History   Cholecystectomy   Orthopedic surgery     Physical Exam     Patient Vitals for the past 24 hrs:   BP Temp Temp src Pulse Resp SpO2 Height Weight   02/12/25 1004 -- -- -- 63 18 99 % -- --   02/12/25 1002 (!) 143/89 -- -- 64 16 99 % -- --   02/12/25 0906 (!) 158/77 97.7  F (36.5  C) Oral 64 20 99 % 1.575 m (5' 2\") 78.5 kg (173 lb)     Physical Exam  General: Overall stable and nontoxic appearing  HEENT: Conjunctivae " clear, no scleral icterus, mucous membranes moist  Neuro: Alert, moving all extremities equally with intention  CV: Regular rate and rhythm, radial and DP pulses equal  Respiratory: No signs of respiratory distress, lungs clear to auscultation bilaterally   Abdomen: Soft, without rigidity or rebound throughout  MSK: No lower extremity swelling or tenderness. TTP in the anterior chest over the sternum    Diagnostics     Lab Results   Labs Ordered and Resulted from Time of ED Arrival to Time of ED Departure   BASIC METABOLIC PANEL - Abnormal       Result Value    Sodium 143      Potassium 3.9      Chloride 103      Carbon Dioxide (CO2) 27      Anion Gap 13      Urea Nitrogen 35.1 (*)     Creatinine 1.03 (*)     GFR Estimate 57 (*)     Calcium 9.8      Glucose 164 (*)    CBC WITH PLATELETS AND DIFFERENTIAL - Abnormal    WBC Count 10.3      RBC Count 4.95      Hemoglobin 14.0      Hematocrit 41.9      MCV 85      MCH 28.3      MCHC 33.4      RDW 12.8      Platelet Count 310      % Neutrophils 55      % Lymphocytes 30      % Monocytes 6      % Eosinophils 8      % Basophils 1      % Immature Granulocytes 0      NRBCs per 100 WBC 0      Absolute Neutrophils 5.7      Absolute Lymphocytes 3.1      Absolute Monocytes 0.6      Absolute Eosinophils 0.8 (*)     Absolute Basophils 0.1      Absolute Immature Granulocytes 0.0      Absolute NRBCs 0.0     INR - Abnormal    INR 2.35 (*)    TROPONIN T, HIGH SENSITIVITY - Normal    Troponin T, High Sensitivity 10     D DIMER QUANTITATIVE - Normal    D-Dimer Quantitative <0.27     TROPONIN T, HIGH SENSITIVITY - Normal    Troponin T, High Sensitivity 8     TROPONIN T, HIGH SENSITIVITY     Imaging   Chest XR,  PA & LAT   Final Result   IMPRESSION: No acute cardiac pulmonary process. Cholecystectomy clips.      Report per radiology.    EKG   See ED course.     Independent Interpretation   CXR: No infiltrate, pleural effusion, or pulmonary edema.    ED Course      Medications Administered    Medications   acetaminophen (TYLENOL) tablet 1,000 mg (1,000 mg Oral $Given 2/12/25 1023)   alum & mag hydroxide-simethicone (MAALOX) suspension 15 mL (15 mLs Oral $Given 2/12/25 1341)     Procedures   Procedures     Discussion of Management   None    ED Course   ED Course as of 02/12/25 1530   Wed Feb 12, 2025   1010 I obtained history and examined the patient as noted above.    1019 EKG 0914 Rate Sinus rhythm with incomplete RBB, ST depression in avL appears new compared to EKG from 11/13/2023    Rate 64  QRS 92 Qtc 437   1237 Repeat EKG 1217  Sinus rhythm with incomplete RBBB   Rate 60  QRS 94 QTc 446     Additional Documentation  None    Medical Decision Making / Diagnosis     CMS Diagnoses: None    MIPS       None    MDM   Soledad Palomares is a 74 year old female who presents to the emergency department with a chief complaint of chest pain.  Patient's description of the pain is atypical for ACS, she describes it as a squeezing sensation that comes on every few minutes lasting about 30 seconds.  Her shortness of breath is only when the pain arrives.  Her EKG here shows some ST depression in aVL which appears to be new from 2023.  Repeat EKG here does not show any significant changes, there is a questionable T wave inversion in V3 however there is none on the QRS right before that.  Patient's troponin was repeated 3 times out of abundance of caution, there is no delta troponin.  With her shortness of breath, did obtain D-dimer, this was negative and additionally she is therapeutic on her anticoagulation therefore I feel that PE is less likely.  With symptoms that have been ongoing for the past week and overall stability well appearance at this time, my suspicion for aortic dissection is low.  No evidence of pneumonia on her chest x-ray.  I discussed the overall workup with patient and her significant other, counseled close follow-up with her primary care provider.  Strict return precautions  discussed, patient verbalizes understanding.  She is eager to go home and comfortable with the plan to do so.      Disposition   The patient was discharged.     Diagnosis     ICD-10-CM    1. Atypical chest pain  R07.89       2. Hypertension, unspecified type  I10       3. Anticoagulated  Z79.01          Discharge Medications   New Prescriptions    No medications on file     Scribe Disclosure:  I, Fiona Ramos, am serving as a scribe at 10:19 AM on 2/12/2025 to document services personally performed by Carlie Pacheco MD based on my observations and the provider's statements to me.        Carlie Pacheco MD  02/12/25 [id number]

## 2025-02-12 NOTE — ED TRIAGE NOTES
Patient complains of mild chest pain for a week. States last night pain became worse. States worse with deep breath, with coughing. States feels SOB.

## 2025-02-12 NOTE — DISCHARGE INSTRUCTIONS
You were seen in the emergency department for chest pain.  Your exam and workup was overall reassuring here, we do not see signs of acute emergency at this time.  Please call your primary care doctor to schedule follow-up appointment the next 2 to 3 days.    Come back to the emergency department if you start to have chest pain that is persistent, worse with moving around and walking, you have nausea vomiting with that you are sweaty with it, you have severe difficulty breathing, fainting, coughing up blood, or any other concerning symptoms.

## (undated) RX ORDER — REGADENOSON 0.08 MG/ML
INJECTION, SOLUTION INTRAVENOUS
Status: DISPENSED
Start: 2019-11-15

## (undated) RX ORDER — ALBUTEROL SULFATE 90 UG/1
AEROSOL, METERED RESPIRATORY (INHALATION)
Status: DISPENSED
Start: 2019-11-15

## (undated) RX ORDER — AMINOPHYLLINE 25 MG/ML
INJECTION, SOLUTION INTRAVENOUS
Status: DISPENSED
Start: 2019-11-15